# Patient Record
Sex: FEMALE | Race: BLACK OR AFRICAN AMERICAN | Employment: OTHER | URBAN - METROPOLITAN AREA
[De-identification: names, ages, dates, MRNs, and addresses within clinical notes are randomized per-mention and may not be internally consistent; named-entity substitution may affect disease eponyms.]

---

## 2020-01-01 ENCOUNTER — OFFICE VISIT - HEALTHEAST (OUTPATIENT)
Dept: GERIATRICS | Facility: CLINIC | Age: 80
End: 2020-01-01

## 2020-01-01 ENCOUNTER — MEDICAL CORRESPONDENCE (OUTPATIENT)
Dept: HEALTH INFORMATION MANAGEMENT | Facility: CLINIC | Age: 80
End: 2020-01-01

## 2020-01-01 ENCOUNTER — HOME CARE/HOSPICE - HEALTHEAST (OUTPATIENT)
Dept: HOME HEALTH SERVICES | Facility: HOME HEALTH | Age: 80
End: 2020-01-01

## 2020-01-01 ENCOUNTER — TELEPHONE (OUTPATIENT)
Dept: FAMILY MEDICINE | Facility: CLINIC | Age: 80
End: 2020-01-01

## 2020-01-01 ENCOUNTER — AMBULATORY - HEALTHEAST (OUTPATIENT)
Dept: GERIATRICS | Facility: CLINIC | Age: 80
End: 2020-01-01

## 2020-01-01 ENCOUNTER — TRANSFERRED RECORDS (OUTPATIENT)
Dept: HEALTH INFORMATION MANAGEMENT | Facility: CLINIC | Age: 80
End: 2020-01-01

## 2020-01-01 ENCOUNTER — DOCUMENTATION ONLY (OUTPATIENT)
Dept: FAMILY MEDICINE | Facility: CLINIC | Age: 80
End: 2020-01-01

## 2020-01-01 ENCOUNTER — RECORDS - HEALTHEAST (OUTPATIENT)
Dept: ADMINISTRATIVE | Facility: OTHER | Age: 80
End: 2020-01-01

## 2020-01-01 ENCOUNTER — COMMUNICATION - HEALTHEAST (OUTPATIENT)
Dept: SCHEDULING | Facility: CLINIC | Age: 80
End: 2020-01-01

## 2020-01-01 ENCOUNTER — COMMUNICATION - HEALTHEAST (OUTPATIENT)
Dept: GERIATRICS | Facility: CLINIC | Age: 80
End: 2020-01-01

## 2020-01-01 ENCOUNTER — VIRTUAL VISIT (OUTPATIENT)
Dept: FAMILY MEDICINE | Facility: CLINIC | Age: 80
End: 2020-01-01
Payer: MEDICARE

## 2020-01-01 ENCOUNTER — DOCUMENTATION ONLY (OUTPATIENT)
Dept: OTHER | Facility: CLINIC | Age: 80
End: 2020-01-01

## 2020-01-01 ENCOUNTER — AMBULATORY - HEALTHEAST (OUTPATIENT)
Dept: OTHER | Facility: CLINIC | Age: 80
End: 2020-01-01

## 2020-01-01 ENCOUNTER — RECORDS - HEALTHEAST (OUTPATIENT)
Dept: LAB | Facility: CLINIC | Age: 80
End: 2020-01-01

## 2020-01-01 VITALS — HEIGHT: 67 IN | WEIGHT: 158 LBS | BODY MASS INDEX: 24.8 KG/M2

## 2020-01-01 VITALS — WEIGHT: 158 LBS | BODY MASS INDEX: 24.38 KG/M2

## 2020-01-01 VITALS — WEIGHT: 158 LBS | BODY MASS INDEX: 23.95 KG/M2 | HEIGHT: 68 IN

## 2020-01-01 DIAGNOSIS — G62.9 NEUROPATHY: ICD-10-CM

## 2020-01-01 DIAGNOSIS — K59.00 CONSTIPATION, UNSPECIFIED CONSTIPATION TYPE: Primary | ICD-10-CM

## 2020-01-01 DIAGNOSIS — R41.3 SHORT-TERM MEMORY LOSS: ICD-10-CM

## 2020-01-01 DIAGNOSIS — I10 BENIGN ESSENTIAL HYPERTENSION: ICD-10-CM

## 2020-01-01 DIAGNOSIS — E87.6 HYPOKALEMIA: ICD-10-CM

## 2020-01-01 DIAGNOSIS — J18.9 PNEUMONIA OF RIGHT LOWER LOBE DUE TO INFECTIOUS ORGANISM: ICD-10-CM

## 2020-01-01 DIAGNOSIS — M79.605 PAIN IN BOTH LOWER EXTREMITIES: ICD-10-CM

## 2020-01-01 DIAGNOSIS — K59.00 CONSTIPATION, UNSPECIFIED CONSTIPATION TYPE: ICD-10-CM

## 2020-01-01 DIAGNOSIS — G60.9 IDIOPATHIC PERIPHERAL NEUROPATHY: ICD-10-CM

## 2020-01-01 DIAGNOSIS — Z87.01 HISTORY OF PNEUMONIA: ICD-10-CM

## 2020-01-01 DIAGNOSIS — R62.51 FAILURE TO THRIVE (0-17): ICD-10-CM

## 2020-01-01 DIAGNOSIS — E44.0 PROTEIN-CALORIE MALNUTRITION, MODERATE (H): ICD-10-CM

## 2020-01-01 DIAGNOSIS — M79.604 PAIN IN BOTH LOWER EXTREMITIES: ICD-10-CM

## 2020-01-01 DIAGNOSIS — I10 ESSENTIAL HYPERTENSION, BENIGN: ICD-10-CM

## 2020-01-01 DIAGNOSIS — R62.7 FAILURE TO THRIVE IN ADULT: ICD-10-CM

## 2020-01-01 DIAGNOSIS — M17.11 PRIMARY OSTEOARTHRITIS OF RIGHT KNEE: ICD-10-CM

## 2020-01-01 DIAGNOSIS — M16.0 PRIMARY OSTEOARTHRITIS OF BOTH HIPS: ICD-10-CM

## 2020-01-01 DIAGNOSIS — R19.7 DIARRHEA, UNSPECIFIED TYPE: ICD-10-CM

## 2020-01-01 DIAGNOSIS — R27.8 UNABLE TO BALANCE WHEN STANDING: ICD-10-CM

## 2020-01-01 DIAGNOSIS — R62.7 ADULT FAILURE TO THRIVE: Primary | ICD-10-CM

## 2020-01-01 DIAGNOSIS — R62.7 ADULT FAILURE TO THRIVE: ICD-10-CM

## 2020-01-01 DIAGNOSIS — M62.59 ATROPHY OF MUSCLE OF MULTIPLE SITES: ICD-10-CM

## 2020-01-01 DIAGNOSIS — E11.9 TYPE 2 DIABETES MELLITUS WITHOUT COMPLICATION, WITHOUT LONG-TERM CURRENT USE OF INSULIN (H): Primary | ICD-10-CM

## 2020-01-01 DIAGNOSIS — R91.8 PULMONARY NODULES: ICD-10-CM

## 2020-01-01 DIAGNOSIS — E04.1 THYROID NODULE: ICD-10-CM

## 2020-01-01 DIAGNOSIS — G62.9 PERIPHERAL POLYNEUROPATHY: ICD-10-CM

## 2020-01-01 DIAGNOSIS — E11.65 TYPE 2 DIABETES MELLITUS WITH HYPERGLYCEMIA, WITHOUT LONG-TERM CURRENT USE OF INSULIN (H): ICD-10-CM

## 2020-01-01 LAB
ALBUMIN SERPL-MCNC: 2.2 G/DL (ref 3.5–5)
ALP SERPL-CCNC: 109 U/L (ref 45–120)
ALT SERPL W P-5'-P-CCNC: 15 U/L (ref 0–45)
ANION GAP SERPL CALCULATED.3IONS-SCNC: 10 MMOL/L (ref 5–18)
AST SERPL W P-5'-P-CCNC: 17 U/L (ref 0–40)
BILIRUB SERPL-MCNC: 0.3 MG/DL (ref 0–1)
BUN SERPL-MCNC: 8 MG/DL (ref 8–28)
CALCIUM SERPL-MCNC: 8.2 MG/DL (ref 8.5–10.5)
CHLORIDE BLD-SCNC: 107 MMOL/L (ref 98–107)
CO2 SERPL-SCNC: 25 MMOL/L (ref 22–31)
CREAT SERPL-MCNC: 0.55 MG/DL (ref 0.6–1.1)
GFR SERPL CREATININE-BSD FRML MDRD: >60 ML/MIN/1.73M2
GLUCOSE BLD-MCNC: 94 MG/DL (ref 70–125)
POTASSIUM BLD-SCNC: 4.1 MMOL/L (ref 3.5–5)
PROT SERPL-MCNC: 4.8 G/DL (ref 6–8)
SODIUM SERPL-SCNC: 142 MMOL/L (ref 136–145)

## 2020-01-01 RX ORDER — ACETAMINOPHEN 500 MG
500-1000 TABLET ORAL EVERY 6 HOURS PRN
COMMUNITY
Start: 2020-01-01

## 2020-01-01 RX ORDER — ATORVASTATIN CALCIUM 10 MG/1
10 TABLET, FILM COATED ORAL DAILY
Qty: 90 TABLET | Refills: 1 | Status: SHIPPED | OUTPATIENT
Start: 2020-01-01

## 2020-01-01 RX ORDER — CHLORTHALIDONE 25 MG/1
12.5 TABLET ORAL DAILY
COMMUNITY
Start: 2020-01-01 | End: 2020-01-01

## 2020-01-01 RX ORDER — METFORMIN HCL 500 MG
500 TABLET, EXTENDED RELEASE 24 HR ORAL
Qty: 90 TABLET | Refills: 3 | Status: SHIPPED | OUTPATIENT
Start: 2020-01-01

## 2020-01-01 RX ORDER — POLYETHYLENE GLYCOL 3350 17 G/17G
1 POWDER, FOR SOLUTION ORAL DAILY
COMMUNITY
Start: 2020-01-01

## 2020-01-01 RX ORDER — CHLORTHALIDONE 25 MG/1
12.5 TABLET ORAL DAILY
Qty: 45 TABLET | Refills: 1 | Status: SHIPPED | OUTPATIENT
Start: 2020-01-01 | End: 2020-01-01 | Stop reason: SINTOL

## 2020-01-01 RX ORDER — ATORVASTATIN CALCIUM 10 MG/1
10 TABLET, FILM COATED ORAL DAILY
COMMUNITY
End: 2020-01-01

## 2020-01-01 ASSESSMENT — MIFFLIN-ST. JEOR
SCORE: 1134.71
SCORE: 1204.56
SCORE: 1232.24
SCORE: 1204.56
SCORE: 1204.56
SCORE: 1224.31

## 2020-05-13 PROBLEM — R91.8 PULMONARY NODULES: Status: ACTIVE | Noted: 2020-01-01

## 2020-05-13 PROBLEM — E04.1 THYROID NODULE: Status: ACTIVE | Noted: 2020-01-01

## 2020-05-13 NOTE — PROGRESS NOTES
Preceptor Attestation:   Patient seen and evaluated via video visit. I discussed the patient with the resident. I have verified the content of the note, which accurately reflects my assessment of the patient and the plan of care.   Supervising Physician:  Juve Lundberg MD.

## 2020-05-13 NOTE — PROGRESS NOTES
"Family Medicine Video Visit Note  Veronica is being evaluated via a billable video visit.               Video Visit Consent     Patient was verbally read the following and verbal consent was obtained.  \"Video visits are billed at different rates depending on your insurance coverage. During this emergency period, for some insurers they may be billed the same as an in-person visit.  Please reach out to your insurance provider with any questions.  If during the course of the call the physician/provider feels a telephone visit is not appropriate, you will not be charged for this service.\"     (Name person giving consent:  Patient   Date verbal consent given:  5/13/2020  Time verbal consent given:  12:34 PM)    Patient would like the video invitation sent by: Text to cell phone: [phone]                      HPI       Video Start Time: 12:49 PM    Veroinca was recently hospitalized for the following issues. She is feeling much better at the moment. She has difficulty walking and her daughter and brother's partner take care of her and live in the house.     Veronica presents to clinic today for the following health issues:    Elevated Troponin:  Hospital Follow-up. She was hospitalized for FFT and found to have an elevated troponin with a normal Echo and Stress test. No chest pain or SOB. Her HR feels normal. Unclear etiology.     Leg Pain/Osteoarthritis:  4 month history, Neg doppler US, Normal B12. She is taking 1000 mg of tylenol every 8 hours. The recommended home PT/OT, the family would like home PT.      Constipation/Diarrhea with Hypokalemia and Hypomagnesemia:  Constipation with diarrhea and elctrolyte disturbances. Improved. Diarreha is improved. She is eating 3 meals a day now.     Hypertension: She was started on chlorthalidone, no side effects. No headache or blurry vision. No new weakness or fainting.     The daughter reports that she needs a toilet seat that is elevated. They did not get a DME order for this but " would like one. They already have a transfer belt.   [facility] [address]  Fax: [phone]  Phone: [phone]     Patient berry fever, chills, dysuria, headache, fainting, dizziness, lightheadedness, weight loss.     She has a Fhx of DMII and HTN    She does smoke      Current Outpatient Medications   Medication Sig Dispense Refill     atorvastatin (LIPITOR) 10 MG tablet Take 10 mg by mouth daily       No Known Allergies          Assessment and Plan       ICD-10-CM    1. Adult failure to thrive  R62.7    2. Pulmonary nodules  R91.8    3. Thyroid nodule  E04.1    4. Diarrhea, unspecified type  R19.7 Basic Metabolic Panel ([place])   5. Constipation, unspecified constipation type  K59.00    6. Type 2 diabetes mellitus with hyperglycemia, without long-term current use of insulin (H)  E11.65    7. Primary osteoarthritis of right knee  M17.11 order for DME       1. Home PT  2. Elevated Toilet Seat  3. Continue Chlorthalidone - but consider switching to Amlodipine if hypokalemia persists.   4. Diarrhea and Electrolyte abnormalities: Follow-up BMP - recommended this week, patient's daughter reported that she will and schedule this. Continue Miralax titrated to diarrhea.   5. Continue atorvastatin for now, but consider discontinuing  6. Continue diet modification for DMII, no medications at this time.  7. Lung and Thyroid nodules - after much discussion the family would like to focus on quality of life and avoid procedures. Will discuss again in the future.   8. Nutrition: Discussed eating 3 meals a day and to include vegetables in the diet, especially foods rich in potassium and magnesium like spinach.   9. Follow-up IN CLINIC in 2-4 weeks as able.       Refilled medications that would be required in the next 3 months.     After Visit Information:  Patient declined AVS         Video-Visit Details    Type of service:  Video Visit    Video End Time (time video stopped): 1:21 PM    Originating  Location (pt. Location): Home    Distant Location (provider location):  [facility]     Mode of Communication:  Video Conference via [email or web address] Howell MD  I precepted today with Dr. Lundberg

## 2020-05-14 NOTE — TELEPHONE ENCOUNTER
[facility] phone call message- general phone call:    Reason for call: She had a apt yesterday with  and she was prescribed a walker with a seat and a toilet seat with arms she took it to [facility] and they told her her insurance does not cover it and she would have to pay $80 for that. She is not sure why and needs to talk to someone.    Action desired: call back    Return call needed: Yes    OK to leave a message on voice mail? Yes    Advised patient to response may take up to 2 business days: Yes    Primary language: English      needed? No    Call taken on May 14, 2020 at 1:59 PM by Benny Diamond

## 2020-05-14 NOTE — PATIENT INSTRUCTIONS
05/14/20   HOME CARE REFERRAL  [facility]  Phone: [phone]  Fax: [phone]    Referral, demographics, medication list and last office note faxed to [facility] at [phone] who will contact patient to schedule.    Fartun Frances

## 2020-05-14 NOTE — TELEPHONE ENCOUNTER
Called [facility] and talked to Laney and she said that insurance will not cover the toilet seat and that she needs to pay a portion for the walker, they will not cover the whole thing.  She would have to pay the $80 no matter what or she will not get it. Left msg on pt's voicemail to call me back. See Rueda, CMA

## 2020-05-15 NOTE — TELEPHONE ENCOUNTER
Veronica's daughter, Carmen called back and she said that she talked to Medicare and they said that they will pay 80% for the things she needs and the other 20% will be covered thru [employer]. Told her what was told to me by a representative at [facility] that due to her insurance she will need to pay $80. The daughter is working on this with her insurance at this time. The daughter states that she does not need the walker at this time, states that pt is unable to get out of her bed and walk to the bathroom. Daughter would like for her to get a bedside commode if possible.  PT is coming out to her mom next week to access for a walker. Veronica does have another phone visit on Monday- unsure with who, but hopefully it will be taken care of by Monday or soon. Pt's daughter understand.  See Rueda, CMA

## 2020-05-15 NOTE — TELEPHONE ENCOUNTER
[facility] phone call message- general phone call:    Reason for call: Requesting verbal orders for a delay in PT start of care until 05/17.    Return call needed: Yes    OK to leave a message on voice mail? Yes    Primary language: English      needed? No    Call taken on May 15, 2020 at 9:19 AM by Ute Rosales

## 2020-05-18 NOTE — TELEPHONE ENCOUNTER
[facility] phone call message- general phone call:    Reason for call: Requesting PT 3 times a week for 2 weeks then 2 times a week for 2 weeks to work on strength and mobility. Also looking for an okay to do a OT evaluation in home.     Return call needed: Yes    OK to leave a message on voice mail? Yes    Primary language: English      needed? No    Call taken on May 18, 2020 at 9:17 AM by Grisel Flores-Cardona

## 2020-05-18 NOTE — TELEPHONE ENCOUNTER
Verbal orders for PT 3 times a week for 2 weeks then 2 times a week for 2 weeks to work on strength and mobilityand for an OT evaluation in home given to N    Note routed to Dr.Grosland Ellis RN

## 2020-05-18 NOTE — PROGRESS NOTES
"Family Medicine Video Visit Note  Veronica is being evaluated via a billable video visit.               Video Visit Consent     Patient was verbally read the following and verbal consent was obtained.  \"Video visits are billed at different rates depending on your insurance coverage. During this emergency period, for some insurers they may be billed the same as an in-person visit.  Please reach out to your insurance provider with any questions.  If during the course of the call the physician/provider feels a telephone visit is not appropriate, you will not be charged for this service.\"     (Name person giving consent:  Patient   Date verbal consent given:  2020  Time verbal consent given:  9:04 AM)    Patient would like the video invitation sent by: Text to cell phone: [phone]             No chief complaint on file.      {If Provider starts visit, do consent and rooming above using \"VIRTUAL tab.\" DELETE THIS TEXT.}     {If PCS NOT rooming AND it is an interpreted visit, fill out the following. Otherwise delete.}  Due to patient being non-English speaking/uses sign language, an  was used for this visit. Only for face-to-face interpretation by an external agency, date and length of interpretation can be found on the scanned worksheet.     name: ***  Agency: {FMinterpagency:221337}  Language: {FMinterplan}   Telephone number: ***  Type of interpretation: {[facility]201362}           HPI     {Tell patient that you will call them back if the video visit is disconnected.  Confirm phone number to call them back on if you have not already done so.}  Video Start Time: { :8738332}    Veronica presents to clinic today for the following health issues:    {SUPERLIST (Optional):656671}    {Additional problems for the provider to add (optional):674263}  Current Outpatient Medications   Medication Sig Dispense Refill     acetaminophen (TYLENOL) 500 MG tablet Take 1-2 tablets (500-1,000 mg) by " "mouth every 6 hours as needed for mild pain       atorvastatin (LIPITOR) 10 MG tablet Take 10 mg by mouth daily       chlorthalidone (HYGROTON) 25 MG tablet Take 0.5 tablets (12.5 mg) by mouth daily       order for DME Equipment being ordered: Elevated toilet seat 1 Product 0     polyethylene glycol (MIRALAX) 17 g packet Take 17 g by mouth daily       No Known Allergies           Review of Systems:     {ROS COMP (Optional):804175}         Physical Exam:     There were no vitals taken for this visit.  Estimated body mass index is 24.38 kg/m  as calculated from the following:    Height as of 5/13/20: 1.715 m (5' 7.5\").    Weight as of 5/13/20: 71.7 kg (158 lb).    {video visit exam brief selected:631518::\"GENERAL: Healthy, alert and no distress\",\"EYES: Eyes grossly normal to inspection.  No discharge or erythema, or obvious scleral/conjunctival abnormalities.\",\"RESP: No audible wheeze, cough, or visible cyanosis.  No visible retractions or increased work of breathing.  \",\"SKIN: Visible skin clear. No significant rash, abnormal pigmentation or lesions.\",\"NEURO: Cranial nerves grossly intact.  Mentation and speech appropriate for age.\",\"PSYCH: Mentation appears normal, affect normal/bright, judgement and insight intact, normal speech and appearance well-groomed.\"}      {Result Choices:549650}          Assessment and Plan   {Diag Picklist:752683}    Refilled medications that would be required in the next 3 months.     After Visit Information:  {avs options:546832}      No follow-ups on file.      Video-Visit Details    Type of service:  Video Visit    Video End Time (time video stopped): { :2738798}    Originating Location (pt. Location): {video visit patient location:514130::\"Home\"}    Distant Location (provider location):  [facility]     Mode of Communication:  Video Conference via [email or web address]      Ivan Beal MD  I precepted today with ***    { Help text -click delete when highlighted- Precept ALL visits. " Preceptor MUST SEE the patient. Bill regular E&M codes for this visit! Documentation needs to support your billing.}

## 2020-05-18 NOTE — PROGRESS NOTES
"Family Medicine Video Visit Note  Veronica is being evaluated via a billable video visit.             Video Visit Consent     Patient was verbally read the following and verbal consent was obtained.  \"Video visits are billed at different rates depending on your insurance coverage. During this emergency period, for some insurers they may be billed the same as an in-person visit.  Please reach out to your insurance provider with any questions.  If during the course of the call the physician/provider feels a telephone visit is not appropriate, you will not be charged for this service.\"     (Name person giving consent:  Patient   Date verbal consent given:  5/18/2020  Time verbal consent given:  9:04 AM)    Patient would like the video invitation sent by: Text to cell phone: [phone]        Chief Complaint   Patient presents with     Constipation     patient has no bowel movement since last Tuesday             HPI     Video Start Time: 9:20 AM    Veronica presents to clinic today for the following health issues:    Patient is seen by video visit today for follow-up of failure to thrive and new complaint of constipation.  Recently, patient was hospitalized earlier this month for failure to thrive and found to be hypokalemic and hypertensive.  Her potassium had normalized prior to discharge and thus was not discharged with potassium supplementation.  She was discharged home with PT and OT.  She lives at home with her daughter and daughter's partner.    Since being home, she has been too weak to get up to go to the bathroom and has been wearing depends.  She is not had all movement since Tuesday, though she has been passing gas.  Urinating normally.  She does not complain of any abdominal pain, nausea, vomiting.  She has been eating normally, eating accommodation of meats and vegetables, though her daughter notes that she is \"quite picky\".  She had been discharged with MiraLAX, but she is not been taking this.  She had used " "MiraLAX in the past to \"stay regular\".  Her daughter thinks that part of the problem with not stooling is that her mother just does not want to have a bowel movement in her depends.  She is not using any fiber supplementation.  She is not having any cramping of the extremities, no chest pain.    Her adult daughter also notes that she thinks that having a bedside commode would be helpful, given that her mother is still quite immobile.  Physical therapy was out yesterday for evaluation, they have not yet heard from OT regarding initial evaluation.  The daughter does have the number to call, and plans on doing this today.    Current Outpatient Medications   Medication Sig Dispense Refill     acetaminophen (TYLENOL) 500 MG tablet Take 1-2 tablets (500-1,000 mg) by mouth every 6 hours as needed for mild pain       atorvastatin (LIPITOR) 10 MG tablet Take 10 mg by mouth daily       chlorthalidone (HYGROTON) 25 MG tablet Take 0.5 tablets (12.5 mg) by mouth daily       order for DME Equipment being ordered: Elevated toilet seat 1 Product 0     polyethylene glycol (MIRALAX) 17 g packet Take 17 g by mouth daily       No Known Allergies           Review of Systems:     12 point review of systems was negative except as stated above in HPI         Physical Exam:     Wt 71.7 kg (158 lb)   BMI 24.38 kg/m    Estimated body mass index is 24.38 kg/m  as calculated from the following:    Height as of 5/13/20: 1.715 m (5' 7.5\").    Weight as of this encounter: 71.7 kg (158 lb).    GENERAL: Patient resting comfortably in bed in no acute distress  EYES: Eyes grossly normal to inspection, makes eye contact with phone camera.  Wearing glasses  RESP: No audible wheeze, cough, or visible cyanosis.  No visible retractions or increased work of breathing.    SKIN: Visible skin clear. No significant rash, abnormal pigmentation or lesions.  NEURO: Cranial nerves grossly intact.  Mentation and speech appropriate for age.  Did not attempt to have " patient ambulate.  PSYCH: Mentation appears normal, affect normal/bright, judgement and insight intact, normal speech and appearance well-groomed.    No new results review, unable to perform labs today as this was a video visit          Assessment and Valdez Martin was seen today for constipation.    Diagnoses and all orders for this visit:    Constipation, unspecified constipation type  Patient with roughly 1 week without bowel movement although is passing flatus.  She is not having any abdominal discomfort.  Has not been using prescribed MiraLAX, previously used this without issue.  Suspect there may be some hesitancy to defecate in her depends.  I did make sure that they had the correct number for occupational therapy so that they can call.  I do think that a bedside commode would help her to be helpful to have OT's recommendations prior to ordering this.  Discussed increasing fiber intake, possibly some juice.  Also discussed that it would be helpful to start the MiraLAX.  Discussed the importance of coming in this week for lab only visit, as constipation could be related to electrolyte abnormalities as well.  -     Magnesium ([facility]); Future  -     Comprehensive Metabolic ([facility]) - Results > 1 hr; Future    Hypokalemia  Adult failure to thrive  Potassium was normal on discharge, she is now on supplementation.  Daughter will call to schedule lab only visit for this week for CMP and magnesium.  -     Magnesium ([facility]); Future  -     Comprehensive Metabolic ([facility]) - Results > 1 hr; Future    Benign essential hypertension  Patient was started on antihypertensives on the patient, with the possibility that it would need to be titrated as an outpatient.  They do not have a home blood pressure cuff, ordered this today.  Instructed them to check 1-2 times daily and log these.  No changes to blood pressure medications today.  -     Home Blood Pressure Monitor    Refilled medications that would be  required in the next 3 months.     After Visit Information:  Patient chose to view AVS via [employer]      No follow-ups on file.      Video-Visit Details    Type of service:  Video Visit    Video End Time (time video stopped): 9:39 AM    Originating Location (pt. Location): Home    Distant Location (provider location):  [facility] [address]     Mode of Communication:  Video Conference via [employer]    Ivan Beal MD  I precepted today with Dr. Mccormick      DME (Durable Medical Equipment) Orders and Documentation  Orders Placed This Encounter   Procedures     Home Blood Pressure Monitor      The patient was assessed and it was determined the patient is in need of the following listed DME Supplies/Equipment. Please complete supporting documentation below to demonstrate medical necessity.      DME All Other Item(s) Documentation    List reason for need and supporting documentation for medical necessity below for each DME item.     1. Face to face was completed via phone visit. Patient has benign essential hypertension, titration of medications may be needed based on home BP readings.

## 2020-05-19 NOTE — PROGRESS NOTES
Preceptor Attestation:   Patient seen and evaluated via video visit. I discussed the patient with the resident. I have verified the content of the note, which accurately reflects my assessment of the patient and the plan of care.   Supervising Physician:  Philippe Mccormick MD.

## 2020-05-21 NOTE — TELEPHONE ENCOUNTER
[facility] phone call message-patient reporting a symptom:     Symptom: pt has appt for lab Friday 05/22/2020 but daughter stating mom is not eating much, not getting out of bed, daughter frustrated and wondering if someone can come out to home and draw blood.     Same Day Visit Offered: no    Additional comments: .    OK to leave message on voice mail? Yes    Primary language: English      needed? No    Call taken on May 21, 2020 at 1:41 PM by Rocio Ball

## 2020-05-21 NOTE — TELEPHONE ENCOUNTER
Daughter Carmen (pie a) asking if someone can come to pt's house and draw blood. Daughter informed that since pt does not have home services there is a company that will come to the home but at a cost to the patient. Daughter sounds frustrated stating she has been meeting resistance from patient and patient's , resistance from insurance carrier in what will be covered.  Writer offered consult with  to discuss care for her Mother and maybe respite care for her.  Daughter agreeable to  calling . Daughter states she will try to have her mother get her labs done     Note routed to BRIT Ellis RN

## 2020-05-26 NOTE — TELEPHONE ENCOUNTER
[facility] phone call message- medication clarification/question:    Full Medication Name:     DME - Bedside Cammode    Question: Wondering when they'll get the bedside commode because pt cannot get up to walk to the restroom.      And Wondering about referral for someone to go to their house to draw blood from pt.     Pharmacy confirmed as [id number] - [place], [place] - [address]: Yes    OK to leave a message on voice mail? Yes    Primary language: English      needed? No    Call taken on May 26, 2020 at 11:51 AM by Lynn Kaufman CMA

## 2020-05-26 NOTE — TELEPHONE ENCOUNTER
Daughter requesting bedside commode pt is unable to walk to bathroom.   Daughter concerned that insurance will not pay. Writer spoke with [facility] who states the commode is billable with pt's insurance.    btrn

## 2020-06-03 NOTE — TELEPHONE ENCOUNTER
[facility] phone call message - order or referral request for patient:     Order or referral being requested: ORDERS      Additional Comments: OT 3 visits in 30 days for adaptive equipment needs. Also medical social workers order 1 visit in 8 days for community resources     OK to leave a message on voice mail? Yes      Primary language: English      needed? No    Call taken on Emmy 3, 2020 at 4:37 PM by Benny Diamond

## 2020-06-03 NOTE — TELEPHONE ENCOUNTER
LUIS DANIEL returned the call to Ms. Veronica Dia' daughter, Carmen. Reached her via the mobile # listed. Carmen states that her mom needs help with a lot of different things including but not limited to bathing, toileting, dressing, grooming. She states that Veronica has Medicare and a supplemental commercial policy through [employer]- this is through Vreonica's spouse' employer or union it sounds like.     Carmen is frustrated because she states that insurance won't pay for PCA supports for her mom. They were having issues getting her a commode as well but it sounds like that is resolving with an OT visit this afternoon at 1pm, where they will complete the assessment and necessary paperwork.     Veronica is currently receive PT/OT. She does not like her PT person and doesn't want to do the exercises when they are there and when they leave. Carmen is trying to encourage her and remind her that she needs to do the exercises to increase functioning and mobility.     In addition, Veronica needs lab draws and cannot get to clinic. Carmen states insurance won't pay for lab draws in the home. She is wondering if home care will help with this in the interim or while they are providing in home PT/OT. SW agreed to check on this.     Carmen informs that her mom does not qualify for Medicaid. She is retired and not working but states she was an employee for the 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     This SW was empathetic to the conversation and is willing to help as best as they are able. Informed that in most cases, those who do not qualify for [place] and need in home supports and services, typically have to pay for those supports out of pocket. The alternative in some cases is if the person has paid into some sort of long term care insurance that has home supports built into the policy. Carmen is not sure if her mother has done this but will check with her.     Carmen  would like this SW to call [facility] to verify that none of the above need services are available for coverage under the commercial plan she has. LUIS DANIEL agreed to do so and getting as much information as possible, despite not having a written ISA.    LUIS DANIEL will plan to call [facility] in the next day or 2 and get back to Carmen.     NEERAJ Wilcox

## 2020-06-03 NOTE — TELEPHONE ENCOUNTER
Verbal orders for OT 3 visits in 30 days for adaptive equipment needs. Also medical social workers order 1 visit in 8 days for community resources given to HHN       Note routed to Dr.Grosland Ellis RN

## 2020-06-09 NOTE — TELEPHONE ENCOUNTER
[facility] Family Medicine phone call message- general phone call:    Reason for call: Nurse is calling to continue pt 2 times a week for 3 weeks.     Return call needed: Yes    OK to leave a message on voice mail? Yes    Primary language: English      needed? No    Call taken on June 9, 2020 at 2:20 PM by Grisel Flores-Cardona

## 2020-06-09 NOTE — TELEPHONE ENCOUNTER
Verbal orders to continue PT 2 times a week for 3 weeks. given to [facility]    Note routed to Dr.Grosland Rhonda POWERS

## 2020-06-12 NOTE — PATIENT INSTRUCTIONS
I have placed orders for home care, the commode in the hospital bed.  I hope that these will go through without a hitch, but touch base with [facility] in the next day or 2 to inquire about the commode in the hospital bed.    Home nursing care staff to call you to set up a time for them to come at your home.    06/12/20   HOME CARE REFERRAL  [facility]  Phone: [phone]  Fax: [phone]    Referral, demographics, medication list and last office note faxed to [facility] at [phone] who will contact patient to schedule.    Fartun Frances

## 2020-06-12 NOTE — PROGRESS NOTES
Preceptor Attestation:   Patient seen and evaluated via video visit. I discussed the patient with the resident. I have verified the content of the note, which accurately reflects my assessment of the patient and the plan of care.   Supervising Physician:  Wilder Henley MD.

## 2020-06-12 NOTE — PROGRESS NOTES
"Family Medicine Video Visit Note  Veronica is being evaluated via a billable video visit.           Video Visit Consent     Patient was verbally read the following and verbal consent was obtained.  \"Video visits are billed at different rates depending on your insurance coverage. During this emergency period, for some insurers they may be billed the same as an in-person visit.  Please reach out to your insurance provider with any questions.  If during the course of the call the physician/provider feels a video visit is not appropriate, you will not be charged for this service.\"     (Name person giving consent:  See PCS note   Date verbal consent given:  See PCS note   Time verbal consent given:  See PCS note     Patient would like the video invitation sent by: Text to cell phone: [phone]     Chief Complaint   Patient presents with     RECHECK     discuss hospital bed            HPI     Video Start Time: 8:30 AM    Veronica presents to clinic today for the following health issues:    Patient is seen via video visit along with her daughter to discuss obtaining a hospital bed.  Patient is relatively new to our clinic.  She established with us after being admitted to [facility] at the end of May of this year for failure to thrive, hypomagnesemia, hypokalemia.  She is continued to be mostly bedbound, has pain related to arthritis in her right knee and complains of bilateral foot numbness.  She does not have any pain in the feet.  Her daughter does check her feet every day for cuts or wounds.  She does have home PT/OT working with her currently.  She does not have any home nursing care is right now.  Of note, in the hospital she did have elevated A1c between 7-8.  Due to her immobility she has not been able to get to clinic for repeat lab work.  Her daughter is her primary caretaker.      Current Outpatient Medications   Medication Sig Dispense Refill     acetaminophen (TYLENOL) 500 MG tablet Take 1-2 tablets (500-1,000 " "mg) by mouth every 6 hours as needed for mild pain       atorvastatin (LIPITOR) 10 MG tablet Take 10 mg by mouth daily       chlorthalidone (HYGROTON) 25 MG tablet Take 0.5 tablets (12.5 mg) by mouth daily       metFORMIN (GLUCOPHAGE-XR) 500 MG 24 hr tablet Take 1 tablet (500 mg) by mouth daily (with dinner) 90 tablet 3     polyethylene glycol (MIRALAX) 17 g packet Take 17 g by mouth daily       Misc. Devices (COMMODE BEDSIDE) MISC 1 Units as needed (voiding) 1 each 0     order for DME Equipment being ordered: Elevated toilet seat (Patient not taking: Reported on 6/12/2020) 1 Product 0     No Known Allergies           Review of Systems:     Complete 12 point ROS negative except as stated above in HPI         Physical Exam:     Ht 1.702 m (5' 7\")   Wt 71.7 kg (158 lb)   BMI 24.75 kg/m    Estimated body mass index is 24.75 kg/m  as calculated from the following:    Height as of this encounter: 1.702 m (5' 7\").    Weight as of this encounter: 71.7 kg (158 lb).    GENERAL: Lying in bed, alert and no distress  EYES: Eyes grossly normal to inspection.  No discharge or erythema, or obvious scleral/conjunctival abnormalities.   RESP: No audible wheeze, cough, or visible cyanosis.  No visible retractions or increased work of breathing.    SKIN: Visible skin clear. No significant rash, abnormal pigmentation or lesions.  NEURO: Mentation and speech appropriate for age.  PSYCH: Mentation appears normal, affect normal/bright, judgement and insight intact, normal speech and appearance well-groomed.      Results from last visit:  No results found for any previous visit.         Assessment and Plan   Veronica was seen today for recheck.    Diagnoses and all orders for this visit:    Patient is seen today to discuss hospital bed acquisition due to her immobility.  Given that she is a relatively new patient to our clinic and follow-up lab work is been difficult to do the COVID pandemic we do not have a great explanation to explain her " bilateral foot numbness.  She does have an elevated A1c above 7, but I would not necessarily expect diabetic neuropathy at this level of an A1c.  Her B12 was normal in the hospital.  Her TSH was normal in the hospital.  Since we have been unable to get lab work, it is possible that she has electrolyte derangements.  Lower suspicion for alcohol or tertiary syphilis.  Could just be that this is age-related or idiopathic.  I do think that hospital but would improve mobility, was certainly make repositioning much easier.  Given her new diagnosis of diabetes, would also be very beneficial to have home nursing care for assessment, diabetes education, and to obtain lab work given that the patient is unable to make it to the clinic.  Initiated metformin for diabetes, side effects discussed.    Type 2 diabetes mellitus without complication, without long-term current use of insulin (H)  -     metFORMIN (GLUCOPHAGE-XR) 500 MG 24 hr tablet; Take 1 tablet (500 mg) by mouth daily (with dinner)  -     HOME CARE NURSING REFERRAL    Peripheral polyneuropathy  -     HOME CARE NURSING REFERRAL    Primary osteoarthritis of both hips  -     HOME CARE NURSING REFERRAL    Primary osteoarthritis of right knee  -     HOME CARE NURSING REFERRAL    Failure to thrive in adult  -     HOME CARE NURSING REFERRAL  -     CBC with Diff Plt ([facility]); Future        Refilled medications that would be required in the next 3 months.     After Visit Information:  Will print and mail AVS     No follow-ups on file.    Video-Visit Details    Type of service:  Video Visit    Video End Time (time video stopped): 8:54 AM    Originating Location (pt. Location): Home    Distant Location (provider location):  [facility]     Mode of Communication:  Video Conference via [employer]      Ivan Beal MD  I precepted today with Dr. Henley

## 2020-06-12 NOTE — PROGRESS NOTES
"Family Medicine Video Visit Note  Veronica is being evaluated via a billable video visit.               Video Visit Consent     Patient was verbally read the following and verbal consent was obtained.  \"Video visits are billed at different rates depending on your insurance coverage. During this emergency period, for some insurers they may be billed the same as an in-person visit.  Please reach out to your insurance provider with any questions.  If during the course of the call the physician/provider feels a video visit is not appropriate, you will not be charged for this service.\"     (Name person giving consent:  Patient and daughter   Date verbal consent given:  6/12/2020  Time verbal consent given:  8:17 AM)    Patient would like the video invitation sent by: Text to cell phone: [phone]             Chief Complaint   Patient presents with     RECHECK     discuss hospital bed                "

## 2020-06-16 NOTE — TELEPHONE ENCOUNTER
[facility] phone call message- general phone call:    Reason for call: She needs a call back re if her mother prescription got sent to [facility] she said last week when she talked to them it seemed like some paperwork needed to be signed and she just wants to know if it was sent    Action desired: call back.    Return call needed: Yes    OK to leave a message on voice mail? Yes    Advised patient to response may take up to 2 business days: Yes    Primary language: English      needed? No    Call taken on June 16, 2020 at 9:37 AM by Benny Diamond

## 2020-06-18 NOTE — TELEPHONE ENCOUNTER
Requested additional information faxed to [facility] regarding insurance approval for commode. Faxed 6/17/20    btrn    Daughter informed of above. Intake number for [facility] given to daughter in response to request for help with becoming pts  POA    BTRN

## 2020-06-22 NOTE — TELEPHONE ENCOUNTER
[facility] Family Medicine phone call message- patient requesting a refill:    Full Medication Name: Chlorthalidone 25mg and Atorvastatin 10mg    Dose: ?    Pharmacy confirmed as   [id number] - [place], [place] - [id number] [place] [address]  Phone: [phone] Fax: [phone]  : Yes    Additional Comments: she needs these filled.     OK to leave a message on voice mail? Yes      Primary language: English      needed? No    Call taken on June 22, 2020 at 8:15 AM by Benny Diamond

## 2020-06-25 NOTE — TELEPHONE ENCOUNTER
[facility]  Per their records Hosp. Bed has been denied by insurance. Supporting documents will be resubmitted.     Daughter informed of above and states her mother needs the hosp. Bed because she does not partticipate in her care.    Note routed to  and Dr.Grosland Rhonda POWERS

## 2020-06-25 NOTE — TELEPHONE ENCOUNTER
[facility] Family Medicine phone call message- general phone call:    Reason for call: the pt called to ask about the medical bed  And the commode and would like a call back        Return call needed: Yes    OK to leave a message on voice mail? Yes      Primary language: English      needed? No    Call taken on June 25, 2020 at 11:27 AM by Jameson Ulloa

## 2020-06-26 NOTE — TELEPHONE ENCOUNTER
Daughter quite concerned with pt's mental status. She is concerned that pt's lack of motivation to care for herself or to move out of the bed may be neurological,early dementia or alzheimer's. Daughter will schedule an in person appt for patient to be evaluated and possible need for referrals.    Note routed to Dr.Grosland Rhonda POWERS

## 2020-06-26 NOTE — TELEPHONE ENCOUNTER
Verbal orders given to HHN for skilled nursing evaluation for skin integrity.        Note routed to Dr.Grosland Rhonda POWERS

## 2020-06-26 NOTE — TELEPHONE ENCOUNTER
[facility] phone call message- general phone call:    Reason for call: She needs to talk to a nurse re her mothers leg pain and she doesn't seem to be doing well mind wise.    Action desired: call back.    Return call needed: Yes    OK to leave a message on voice mail? Yes    Advised patient to response may take up to 2 business days: Yes    Primary language: English      needed? No    Call taken on June 26, 2020 at 9:03 AM by Benny Diamond

## 2020-06-26 NOTE — TELEPHONE ENCOUNTER
[facility] Family Medicine phone call message- general phone call:    Reason for call: Requesting verbal orders for skilled nursing evaluation for skin integrity.     Return call needed: Yes    OK to leave a message on voice mail? Yes    Primary language: English      needed? No    Call taken on June 26, 2020 at 3:48 PM by Grisel Flores-Cardona

## 2020-06-30 NOTE — TELEPHONE ENCOUNTER
[facility] phone call message- general phone call:    Reason for call: they have been seeing her for home care PT they discharged the PT portion because she has canceled  several appointment. But nursing and OT are still in the home.    Action desired: call back.    Return call needed: Yes    OK to leave a message on voice mail? Yes    Advised patient to response may take up to 2 business days: Yes    Primary language: English      needed? No    Call taken on June 30, 2020 at 9:24 AM by Benny Diamond

## 2020-07-23 NOTE — TELEPHONE ENCOUNTER
"Carmen reports her mother is now completely bedbound (was previously getting out sometimes), her legs are increasingly swollen and bruised, and she is sometimes confused. She has been her full time caretaker as their insurance does not help cover a PCA. She is currently living with the patient and her stepfather (patient's ), however is moving out because she does not get along with her stepdad. She is worried about who will care for her mother when she leaves; she notes she wants her mother to come with her but she will not. Her stepfather can \"barealy care for himself.\"     Given mother is bedbound and decompensating, agreed with daughter it would be appropriate to call 911 for transport to the hospital. Encouraged her to bring up the care issues with social workers in the hospital. She is agreeable to this plan and will call us with updates. Routed to LUIS DANIEL Soria. ./LR  "

## 2020-07-23 NOTE — TELEPHONE ENCOUNTER
[facility] Family Medicine phone call message- general phone call:    Reason for call: the pt's daughter called to jj to the  about her mothers health asn would like a call back     Return call needed: Yes    OK to leave a message on voice mail? Yes    Primary language: English      needed? No    Call taken on July 23, 2020 at 10:25 AM by Jameson Ulloa

## 2020-08-06 NOTE — TELEPHONE ENCOUNTER
[facility] Family Medicine phone call message- general phone call:    Reason for call: The Pt is in a assisted living home and the daughter called to talk to the Dr about her mom not being saúl to walkand would like a call back       Return call needed: Yes    OK to leave a message on voice mail? Yes    Primary language: English      needed? No    Call taken on August 6, 2020 at 8:20 AM by Jameson Ulloa

## 2020-08-06 NOTE — TELEPHONE ENCOUNTER
Daughter voicing concerns regarding patient's condition,lack of progress,and amount of care she will need. Daughter's biggest concern is they have not been given a diagnosis or a reason for patient's condition and that pt's c/o leg and feet pain are not being addressed.   Daughter agreeable to speaking with social work again regarding  financial and placement issues. Daughter will bring in new advance directives.     [facility]  [phone]  2nd floor [facility]  [phone]    Note routed to  and SAILAJA Ellis RN

## 2020-08-07 PROBLEM — M79.605 PAIN IN BOTH LOWER EXTREMITIES: Status: ACTIVE | Noted: 2020-01-01

## 2020-08-07 PROBLEM — M79.604 PAIN IN BOTH LOWER EXTREMITIES: Status: ACTIVE | Noted: 2020-01-01

## 2020-08-07 PROBLEM — E87.6 HYPOKALEMIA: Status: ACTIVE | Noted: 2020-01-01

## 2020-08-07 NOTE — TELEPHONE ENCOUNTER
This SW reached out to patient's daughter, Carmen, to discuss financial resources questions and placement concerns. SW spoke with Carmen- she requested a return call a little later as she was currently at work.     NEERAJ Wilcox

## 2020-08-07 NOTE — TELEPHONE ENCOUNTER
Called and talked to Carmen. Veronica has continued to have the same leg symptoms- seems more muscular than originating to specific joints. Also still having the feet numbness. The hope had been that with PT at the TCU her leg pain would improve and she'd become more mobile, with goal of enough mobility to safely be at home (arcelia Carmen has been helping with ADL's, but works during the day so there's significant portions of the day where Veronica is stuck in a bed without ability to get out herself).    Pain: Right knee has known foreign body in it which could obviously be causing pain but leg pain is more generalized. She is on a statin, but the pain has been present before we started that. Has had hypokalemia (likely secondary to chlorthalidone), but had the leg pain even before this was present; other electrolytes are occasionally a little off but not consistently enough to explain chronicity of pain. DVT bilateral US negative on first admission.  Things we still haven't ruled out- polymyalgia rheumatica (had elevated CRP in hospital but in setting of pnx), ischemia to legs.   I think we should get ESR, CRP, CBC, anti-Rh, anti-CCP to look for rheum etiologies, and consider sending to rheum.    Foot numbness: still haven't been able to figure this out either. A1C not consistent with level of neuropathy. B12 normal.     Carmen is going to schedule a phone visit for Veronica my next opening (August 18) so we can order these labs/continue to discuss how things are going. Will also try to reach out to TCU to see if they would be willing to add on labs.    I discussed with Dr. Cavanaugh, who agrees with the plan.    Daniella Orta MD MD PGY2  [facility]

## 2020-08-07 NOTE — TELEPHONE ENCOUNTER
LUIS DANIEL called Carmen back to discuss supports for her mom. She states that she is wondering about payment for the TCU as well as the reason for which she was admitted to the TCU. Carmen states she is the health care agent for her mother and has been very involved in her cares for the last few years.     LUIS DANIEL explained the insurance part first. Stated that it appears Veronica was in the hospital for 5 days. It also appears she has Medicare as a primary insurance and a supplement of a private [employer] policy. LUIS DANIEL indicates that the rule of thumb with Medicare is a 3 day qualifying hospital stay is required for Medicare to subsequently pay for a TCU stay. It appears that Veronica fulfilled that with 5 days at [facility]. Carmen states that her supplemental insurance covers the 20% that Medicare doesn't cover for most services. LUIS DANIEL stated they cannot speak to the supplemental in specific but if that is the case they should not worry too much about excessive and outstanding bills for the TCU.     To the specific diagnosis piece, this SW provided information and education around the variety of reasons in which a person can be sent to a TCU with the main reason being rehabilitation of some sort. What Carmen is confused about is what is causing her mother's weakness and failure to thrive. She indicates that she was part of a care conference with the TCU team yesterday and they indicate that today is the last day of PT. They are considering a readmission to the hospital if Veronica doesn't improve.     Veronica is currently at [facility] TCU. Currently, she is needing 24 hour cares. Carmen wants her mother to move in with her as she states she is best equipped to care for her mom at her home. Carmen states that at Veronica's home is Veronica's elderly partner who cannot take care of her, Veronica's son, and Veronica's daughter in law who requires her own PCA and cannot care for anyone else. Carmen states her home is a single level home that is safe and accessible. She  is going to discuss this with her mom.     Carmen was appreciative of the call. She will call [facility] back if she has further questions.     NEERAJ Wilcox

## 2020-08-28 NOTE — TELEPHONE ENCOUNTER
[facility] Family Medicine phone call message - order or referral request for patient:     Order or referral being requested: SKILLED NURSING        Additional Comments: 2 times a week for 2 weeks 1 time a week for 3 weeks and a 1 month follow-up for pain management and pressure ulcers prevention. The pt has sores on left heal and right lateral ankle. The nurse needs a call back to talk about knee pain the pt is right knee swelling        OK to leave a message on voice mail? Yes    Primary language: English      needed? No    Call taken on August 28, 2020 at 9:07 AM by Jameson Ulloa

## 2020-08-28 NOTE — TELEPHONE ENCOUNTER
Verbal orders given to HHN for SKILLED NURSING            2 times a week for 2 weeks 1 time a week for 3 weeks and a 1 month follow-up for pain management and pressure ulcers prevention.     The pt has sores on left heal and right lateral ankle.    Nurse reports patient continues to have bilateral knee pain pt has limited movement due to pain. Nurse reports some swelling with questionable fluid in knees. Nurse suggesting: course of prednisone a rheum or ortho referral     Patient home with Daughter Carmen    Note routed to Dr.Grosland Ellis RN

## 2020-08-31 NOTE — PROGRESS NOTES
Interprofessional Team Consultation Note     Requesting Provider: Dr. Leonard    Consultants:  Behavioral Health: Dr. Juarez  Care Coordination: Maricruz  PharmD: Dr. Alvarenga  Family Medicine Physicians: Dr. Leonard, Dr. Medina, and Dr. Betts    IDENTIFYING DATA/REASON FOR REFERRAL:  Veronica Dia is 80 year old female who is cared for by Dr. Leonard.? Dr. Leonard is requesting consultation related to getting patient to clinic. ?Relevant clinical information obtained from requesting PCP, interprofessional team members noted above and review of the medical record.     Patient Active Problem List   Diagnosis     Pulmonary nodules     Thyroid nodule     Hypokalemia     Pain in both lower extremities     Failure to thrive (0-17)     Current Outpatient Medications   Medication     acetaminophen (TYLENOL) 500 MG tablet     atorvastatin (LIPITOR) 10 MG tablet     metFORMIN (GLUCOPHAGE-XR) 500 MG 24 hr tablet     Misc. Devices (COMMODE BEDSIDE) MISC     order for DME     polyethylene glycol (MIRALAX) 17 g packet     No current facility-administered medications for this visit.        Topics Discussed:  Dr. Leonard knows this patient from the hospital. Patient is bed-bound due to pain in her legs. Have not been fully able to determine reason for pain. Suspicious it is of rheumatologic nature. Dr. Leonard last saw her at the hospital. At the time had pneumonia with elevated labs. Would like repeat labs. It has been difficult to get patient to come to clinic. Patient lives with her daughter who has a full-time job. Patient therefore is mostly alone at home, but patient prefers this than to a nursing home. Patient's daughter works on site at home and therefore gets to monitor and see patient throughout the day. Patient's sons also help with moving patient.     Patient receives home care nursing and nurse is worried about fluid in patient's legs. SW discussed complexity of patient's insurance and income  level which limits services available to her. Also discussed family and home dynamics known to her. Dr. Leonard that patient has reported her mood has been fine. Pain in her legs is her main frustration. Patient has declined PT in the past.      Recommendations/Action Items:  1. Patient has visit scheduled tomorrow with Dr. Leonard on 8/31/2020. SW will call patient and daughter to remind her of appointment and help address any barriers.   2. Visit will be blue-dotted for consultation with pharmacy since patient has had some med changes since hospital discharge.   3. Dr. Leonard will continue to monitor mood and give a PHQ-9 at next visit for screening of mood. Discussed that CPM assessment may be beneficial for pain management and behavioral health strategies. Dr. Leonard will discuss and offer this if beneficial.     Rocio Juarez, PhD     Disclaimer  The above treatment recommendations are based on consultation with the patient's primary care provider and a review of relevant information in EPIC.? I have not personally examined the patient.? All recommendations should be implemented with considerations of the patient's relevant prior history and current clinical status.  Please contact me with any questions about the care of this patient.

## 2020-08-31 NOTE — TELEPHONE ENCOUNTER
Verbal orders given to OT for: OT first visits with PT (co visits) and then will let pt gain strength with PT and then restart OT 9/28      Also fyi, pt's heart rate 107 and then checked again a little bit after and it was 104.     Note routed to Dr.Grosland Ellis RN

## 2020-08-31 NOTE — TELEPHONE ENCOUNTER
[facility] phone call message- general phone call:    Reason for call:     Verbal Orders    Action desired:     OT -  1x a week for 3 weeks and hold until 09/28/2020. 1x a week for 4 weeks.     Also fyi, pt's heart rate 107 and then checked again a little bit after and it was 104.     Return call needed: Yes    OK to leave a message on voice mail? Yes    Advised patient to response may take up to 2 business days: Yes    Primary language: English      needed? No    Call taken on August 31, 2020 at 2:45 PM by Lynn Kaufman CMA

## 2020-09-01 NOTE — TELEPHONE ENCOUNTER
[facility] phone call message- general phone call:    Reason for call:     Verbal Orders     Action desired:     PT - 2x a week for 3 weeks and 1x a week for 4 weeks for strengtening and manual therapy for range of motion and bed mobility and transfer training and education for pressures relief and covid-19 education prevention.         Return call needed: Yes    OK to leave a message on voice mail? Yes    Advised patient to response may take up to 2 business days: Yes    Primary language: English      needed? No    Call taken on September 1, 2020 at 4:41 PM by Lynn Kaufman CMA

## 2020-09-01 NOTE — TELEPHONE ENCOUNTER
[facility] phone call message- general phone call:    Reason for call: She needs to talk to a nurse.    Action desired: call back.    Return call needed: Yes    OK to leave a message on voice mail? Yes    Advised patient to response may take up to 2 business days: Yes    Primary language: English      needed? No    Call taken on September 1, 2020 at 11:17 AM by Benny Diamond

## 2020-09-01 NOTE — TELEPHONE ENCOUNTER
HHN reporting pt has a deep congested productive cough with rhonchi heard on ausculation.  Nurse states pt recently stopped smoking which might be a factor  Nurse requesting a chest xray which will be done at pt's home  Verbal order given for chest xray.    Note routed to Dr.Grosland Ellis RN

## 2020-09-01 NOTE — PROGRESS NOTES
I have reviewed and agree with the behavioral health fellow's summary and recommendations.  Jigna Burleson, PhD., LP

## 2020-09-02 NOTE — TELEPHONE ENCOUNTER
HHN reports patient will be taken to [facility] ER: declining health, not eating or drinking, bed bound, new pressure ulcer on coccyx.   Chest x ray faxed to clinic no acute cardiopulmonary processes,    Note routed to  and MARÍA Ellis RN.

## 2020-09-02 NOTE — TELEPHONE ENCOUNTER
Message left for PT giving Verbal orders for PT 2x a week for 3 weeks and 1x a week for 4 weeks for strengtening and manual therapy for range of motion and bed mobility and transfer training and education for pressures relief and covid-19 education prevention.     Note routed to Dr.Grosland Ellis RN

## 2020-09-02 NOTE — PROGRESS NOTES
This SW was unable to complete outreach in time for a reminder call. Veronica mills showed the appointment with  on 09/01/2020.     SW called Veronica's daughter, and caretaker, Carmen to assist with rescheduling and troubleshooting any barriers. Carmen answered and states the home care RN is at the home currently. They are deciding if Veronica needs to go back to the hospital due to the pain and no increase in strength. SW indicated that  had mentioned wanting to do further labs and workout to get to the root of the issue. Carmen indicated they would finish the visit with the home care RN and then make a decision about going back to the ED.     LUIS DANIEL will call Carmen tomorrow to schedule a f/u clinic visit as well.     NEERAJ Wilcox

## 2020-09-02 NOTE — TELEPHONE ENCOUNTER
[facility] phone call message- general phone call:    Reason for call: the home care nurse called to ask for the Dr to call her or the nurse. They very concerned about the Pt condition and need a call  back asap    Return call needed: Yes    OK to leave a message on voice mail? Yes    Primary language: English      needed? No    Call taken on September 2, 2020 at 2:19 PM by Jameson Ulloa

## 2020-09-03 NOTE — PROGRESS NOTES
This SW reached out to Carmen, patient's daughter, to inquire if Veronica was hospitalized. She states that they did not go to the ED yesterday, as was advised by the home RN. Carmen states that Veronica was refusing and talked Carmen out of taking her. Carmen told her mom that she would give her a day to see if she improves and starts eating and drinking more. So far today, there hasn't been much improvement. Carmen thinks they will go to [facility] ED later today, as patient is currently living with her daughter in [place].    LUIS DANIEL agree's to call Carmen back tomorrow to follow up on the status of her mom and assist with coordinating PCP f/u at that time.     Carmen states that transportation to the clinic will be difficult. She hasn't been able to independently transport her mother since before the last hospital admission. Carmen has used the non emergency transport services for this. Veronica's insurance doesn't cover the cost of it so each transport is close to $100, for a one way trip. Carmen states if that is the only way to get her to where she needs to be, that is what they'll need to do.     NEERAJ Wilcox

## 2020-09-03 NOTE — TELEPHONE ENCOUNTER
[facility] phone call message- general phone call:    Reason for call:     Fyi REFUSED TO GO TO ER. OFFER ADDITIONAL NURSING VISIT.     FYI, Pt's refused to go to Emergency room last night. Tierney offered additional nursing visit and pt refused.     Action desired:    Asking for recommendation to accommodate pt's pain.     Return call needed: Yes    OK to leave a message on voice mail? Yes    Advised patient to response may take up to 2 business days: Yes    Primary language: English      needed? No    Call taken on September 3, 2020 at 11:42 AM by Lynn Kaufman CMA

## 2020-09-04 NOTE — TELEPHONE ENCOUNTER
[facility] phone call message - order or referral request for patient:     Order or referral being requested: ORDERS      Additional Comments: Orders for skilled nursing for today she was recommended  to go to the hospital but she refuses so they want to go check on her to make sure se is ok.    OK to leave a message on voice mail? Yes       Primary language: English      needed? No    Call taken on September 4, 2020 at 10:32 AM by Benny Diamond

## 2020-09-04 NOTE — TELEPHONE ENCOUNTER
Verbal order for extra SN visit given to HHN along with request for lab work   BMP  CBC  A1C  TSH  ESR  CRP    Note routed to Dr.Grosland Ellis RN

## 2020-09-04 NOTE — TELEPHONE ENCOUNTER
[facility] phone call message- general phone call:    Reason for call: her mother is now living out in [place] with her and she feels like she doesn't need to bring her back to [facility] for any apts, she is in out of hospitals no one is finding anything out for her or what's wrong with her. So she is going to start looking at clinics out by her because it seems like no on knows what's wrong with her mother.    Action desired: call back if questions.    Return call needed: Yes    OK to leave a message on voice mail? Yes    Advised patient to response may take up to 2 business days: Yes    Primary language: English      needed? No    Call taken on September 4, 2020 at 11:33 AM by Benny Diamond

## 2020-09-04 NOTE — TELEPHONE ENCOUNTER
[facility] phone call message- general phone call:    Reason for call:    FYI    Action desired:     Visit today did not go as planned. Unable to do labs. Pt did not drink. Pt does look better today, but pt is still not eating and drinking as much.     Return call needed: Yes    OK to leave a message on voice mail? Yes    Advised patient to response may take up to 2 business days: Yes    Primary language: English      needed? No    Call taken on September 4, 2020 at 2:37 PM by Lynn Kaufman CMA

## 2020-09-04 NOTE — PROGRESS NOTES
LUIS DANIEL reached out to Carmen to f/u on if Veronica was taken to the ED or hospitalized. She answered but indicated she is at work and was unable to talk. She will call this SW back at a later time.     Unsure if patient is home or hospitalized.     NEERAJ Wilcox

## 2020-10-12 NOTE — TELEPHONE ENCOUNTER
HHN states patient has requested hospice home care,nurse reports pt has no desire to get out of bed no desire to go to the hospital and that she has congested lung sounds.  HHN spoke with daughter who states they have not found a new provider yet and would like to continue care at [facility]   Verbal orders for hospice care to consult given    Note routed to Dr. Horacio Ellis RN

## 2020-10-12 NOTE — TELEPHONE ENCOUNTER
[facility] phone call message- general phone call:    Reason for call: she seen [facility] over the weekend for resumption of care but the patient would actually like hospice so they would like a call back for a hospice consult.    Action desired: call back.    Return call needed: Yes    OK to leave a message on voice mail? Yes    Advised patient to response may take up to 2 business days: Yes       Primary language: English      needed? No    Call taken on October 12, 2020 at 8:31 AM by Benny Diamond

## 2020-10-12 NOTE — TELEPHONE ENCOUNTER
[facility] phone call message - order or referral request for patient:     Order or referral being requested: Hospice       Additional Comments: Please fax orders for hospice to [facility] at [phone]    OK to leave a message on voice mail? Yes    Primary language: English      needed? No    Call taken on October 12, 2020 at 2:12 PM by Christiane Herrmann

## 2020-10-13 NOTE — TELEPHONE ENCOUNTER
[facility] phone call message- general phone call:    Reason for call: Doctor should be sending over hospice order to evalute and treat. Hospice is seeing family this afternoon and if it can be sent this morning.     Return call needed: Yes    OK to leave a message on voice mail? Yes    Primary language: English      needed? No    Call taken on October 13, 2020 at 9:33 AM by Grisel Flores-Cardona

## 2020-10-13 NOTE — TELEPHONE ENCOUNTER
Answering Service    I received a page from the answering service 6:15 PM re: Hospice Orders.     Hospice RN Jaelyn, from [facility], called on behalf of patient, Mrs. Veronica Dia regarding needing verbal orders from an attending for Certification of Terminal Illness (CTI) in order for the patient to qualify for hospice cares.     Review of records show an admission to [facility] to our hospital service in July 2020 for failure to thrive and several other diagnoses. Per Hospice RN, the patient has had 3 hospitalizations since March 2020.     Verbal order for CTI was obtained by this resident physician from attending physician, Dr. Jensen Grant and relayed to the hospice RN. However, this patient has not been officially seen by any of our clinic providers since her hospitalization in July. Additionally, we do not have Care Everywhere access to other hospitalizations that may provide further information regarding terminal diagnoses. It would be very helpful to hear the evaluation from the hospice RN.     I will have clinic RNRhonda, call hospice RN or clinic tomorrow morning to find out more information about the home visit with the patient and determine if the patient needs a virtual clinic visit for care coordination.      Jaelyn Tejada MD PGY2  [facility] Medicine Residency  10/13/2020

## 2020-10-14 NOTE — TELEPHONE ENCOUNTER
Spoke with Daughter Carmen regarding scheduling a virtual visit with her mother; Carmen states that scheduling a virtual visit will be difficult due to her work schedule. She states she does not feel an appt is necessary at this point daughter wants to proceed with hospice. She continues to voice her frustration of the treatment her mother received the numerous appt the numerous test with no explanation of her mother's condition. She states her mother saw a doctor in [place] that found a tumor on the pt's spine that had been there for a long time. Patient has had surgery and now her condition warrants hospice care.     Note routed to Dr.Grosland Rhonda POWERS

## 2020-10-16 NOTE — TELEPHONE ENCOUNTER
Talked with daughter again. 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 We discussed that a benign spinal meningioma does not meet criteria for a hospice diagnosis, but that a palliative care consult could be appropriate. Carmen is confused as she says that they were already cleared for hospice.    Spoke over the phone with Cara from [facility], who tells me that after they had made the request from us to give a diagnosis for hospice care, which we could not do as I did not know of any appropriate diagnosis (I haven't seen the patient since her last hospitalization in September, nor did we have the full records of that hospitalization), they had gotten the diagnosis of anoxic brain injury from their own medical director. Per Cara, [facility] does not need anything further from us.

## 2020-10-16 NOTE — TELEPHONE ENCOUNTER
Left message for patient's daughter Carmen explaining that we need a diagnosis and a doctor would need to evaluate her in order to appropriately say that she is a candidate for hospice. Asked her to make an appointment, which could be telephone/virtual visit.    Will attempt calling later today.     Daniella Orta MD MD PGY2  [facility]

## 2020-11-16 NOTE — TELEPHONE ENCOUNTER
[facility] phone call message- general phone call:    Reason for call: she would like a call back from a nurse re her mother.    Action desired: call back.    Return call needed: Yes    OK to leave a message on voice mail? Yes    Advised patient to response may take up to 2 business days: Yes    Primary language: English      needed? No    Call taken on November 16, 2020 at 12:26 PM by Benny Diamond

## 2020-11-16 NOTE — TELEPHONE ENCOUNTER
"Daughter calling regarding mothers medication the amount of opioids she is receiving and how her mother is responding \"like a zombie\" Daughter asking who has been ordering pts meds  Daughter informed the at this time hospice providers are ordering meds. Daughter stated hospice nurse was calling she voiced appreciation for call and ended call.    Note routed to Dr.Grosland Ellis RN  "

## 2020-12-11 ENCOUNTER — TELEPHONE (OUTPATIENT)
Dept: FAMILY MEDICINE | Facility: CLINIC | Age: 80
End: 2020-12-11

## 2020-12-11 NOTE — TELEPHONE ENCOUNTER
[facility] phone call message- general phone call:    Reason for call:     Carlos GARDUNO has passed away on 12/08/2020.      Action desired:    FYI    Return call needed: Yes    OK to leave a message on voice mail? Yes    Advised patient to response may take up to 2 business days: Yes    Primary language: English      needed? No    Call taken on December 11, 2020 at 11:15 AM by Lynn Kaufman CMA

## 2021-01-04 ENCOUNTER — HEALTH MAINTENANCE LETTER (OUTPATIENT)
Age: 81
End: 2021-01-04

## 2021-07-20 VITALS
WEIGHT: 153 LBS | HEIGHT: 68 IN | RESPIRATION RATE: 16 BRPM | OXYGEN SATURATION: 95 % | SYSTOLIC BLOOD PRESSURE: 93 MMHG | TEMPERATURE: 98.3 F | RESPIRATION RATE: 18 BRPM | SYSTOLIC BLOOD PRESSURE: 131 MMHG | OXYGEN SATURATION: 97 % | WEIGHT: 153 LBS | RESPIRATION RATE: 18 BRPM | HEART RATE: 74 BPM | RESPIRATION RATE: 20 BRPM | DIASTOLIC BLOOD PRESSURE: 85 MMHG | RESPIRATION RATE: 18 BRPM | SYSTOLIC BLOOD PRESSURE: 146 MMHG | HEIGHT: 68 IN | RESPIRATION RATE: 17 BRPM | HEART RATE: 82 BPM | SYSTOLIC BLOOD PRESSURE: 136 MMHG | DIASTOLIC BLOOD PRESSURE: 53 MMHG | HEART RATE: 87 BPM | TEMPERATURE: 98.3 F | OXYGEN SATURATION: 97 % | DIASTOLIC BLOOD PRESSURE: 74 MMHG | TEMPERATURE: 96.9 F | DIASTOLIC BLOOD PRESSURE: 64 MMHG | HEIGHT: 68 IN | TEMPERATURE: 98.7 F | HEIGHT: 68 IN | DIASTOLIC BLOOD PRESSURE: 67 MMHG | DIASTOLIC BLOOD PRESSURE: 80 MMHG | BODY MASS INDEX: 23.19 KG/M2 | WEIGHT: 153 LBS | BODY MASS INDEX: 23.19 KG/M2 | SYSTOLIC BLOOD PRESSURE: 126 MMHG | BODY MASS INDEX: 20.85 KG/M2 | HEART RATE: 94 BPM | SYSTOLIC BLOOD PRESSURE: 160 MMHG | WEIGHT: 153 LBS | OXYGEN SATURATION: 94 % | TEMPERATURE: 98.7 F | TEMPERATURE: 98.5 F | OXYGEN SATURATION: 95 % | BODY MASS INDEX: 23.19 KG/M2 | HEART RATE: 111 BPM | OXYGEN SATURATION: 98 % | BODY MASS INDEX: 23.19 KG/M2 | WEIGHT: 137.6 LBS | RESPIRATION RATE: 19 BRPM | HEIGHT: 68 IN | OXYGEN SATURATION: 96 % | WEIGHT: 153 LBS | BODY MASS INDEX: 23.19 KG/M2 | HEART RATE: 92 BPM | HEIGHT: 68 IN | BODY MASS INDEX: 23.19 KG/M2 | HEIGHT: 68 IN | TEMPERATURE: 98.3 F | WEIGHT: 153 LBS | HEART RATE: 84 BPM | SYSTOLIC BLOOD PRESSURE: 122 MMHG | DIASTOLIC BLOOD PRESSURE: 52 MMHG

## 2021-07-20 VITALS
DIASTOLIC BLOOD PRESSURE: 82 MMHG | OXYGEN SATURATION: 95 % | SYSTOLIC BLOOD PRESSURE: 128 MMHG | TEMPERATURE: 97.1 F | HEART RATE: 80 BPM | OXYGEN SATURATION: 97 % | DIASTOLIC BLOOD PRESSURE: 82 MMHG | SYSTOLIC BLOOD PRESSURE: 126 MMHG | OXYGEN SATURATION: 96 % | SYSTOLIC BLOOD PRESSURE: 129 MMHG | SYSTOLIC BLOOD PRESSURE: 124 MMHG | DIASTOLIC BLOOD PRESSURE: 82 MMHG | OXYGEN SATURATION: 97 % | SYSTOLIC BLOOD PRESSURE: 126 MMHG | SYSTOLIC BLOOD PRESSURE: 128 MMHG | HEART RATE: 77 BPM | DIASTOLIC BLOOD PRESSURE: 82 MMHG | SYSTOLIC BLOOD PRESSURE: 132 MMHG | DIASTOLIC BLOOD PRESSURE: 82 MMHG | HEART RATE: 81 BPM | OXYGEN SATURATION: 97 % | DIASTOLIC BLOOD PRESSURE: 80 MMHG | OXYGEN SATURATION: 96 % | SYSTOLIC BLOOD PRESSURE: 130 MMHG | TEMPERATURE: 97.9 F | OXYGEN SATURATION: 97 % | HEART RATE: 83 BPM | OXYGEN SATURATION: 96 % | HEART RATE: 82 BPM | DIASTOLIC BLOOD PRESSURE: 80 MMHG | HEART RATE: 80 BPM | DIASTOLIC BLOOD PRESSURE: 82 MMHG

## 2021-07-20 VITALS
DIASTOLIC BLOOD PRESSURE: 82 MMHG | DIASTOLIC BLOOD PRESSURE: 80 MMHG | SYSTOLIC BLOOD PRESSURE: 126 MMHG | HEART RATE: 81 BPM | SYSTOLIC BLOOD PRESSURE: 130 MMHG | SYSTOLIC BLOOD PRESSURE: 128 MMHG | OXYGEN SATURATION: 97 % | DIASTOLIC BLOOD PRESSURE: 82 MMHG | OXYGEN SATURATION: 96 % | HEART RATE: 78 BPM | OXYGEN SATURATION: 97 %

## 2021-07-20 NOTE — LETTER
Letter by Trevor Reeder CNP at      Author: Trevor Reeder CNP Service: -- Author Type: --    Filed:  Encounter Date: 2020 Status: (Other)         Patient: Veronica Dia   MR Number: [id number]   YOB: 1940   Date of Visit: 2020     [facility]    Name:   Veronica Dia  : 1940  Facility:   [facility] [[id number]]   Room: /210  Code Status: FULL CODE -   Fac type:   SNF (Skilled Nursing Facility, TCU) -     PCP:  Juve Lundberg MD    CHIEF COMPLAINT / REASON FOR VISIT:  Chief Complaint   Patient presents with   ? Discharge Summary     TCU discharge after hospitalization for leg pain, failure to thrive, hypokalemia, hypomagnesemia, hypophosphatemia, and right lower lobe pneumonia.      [facility] from 2020 until 2020 (failure to thrive/severe malnutrition, community-acquired pneumonia, hypokalemia, hypomagnesemia, and hypophosphatemia)    Patient was last seen by me on 2020.      HPI: Veronica is a 80 y.o. female who presented to the ED on 2020 with leg pain and failure to thrive, who was found to have hypokalemia (2.4), hypomagnesemia, and hypophosphatemia.  Electrolytes were replaced.  Physical and occupational therapies and care management were consulted.  She was also treated for community-acquired pneumonia with azithromycin and ceftriaxone, then transitioned to Augmentin and azithromycin (ending on 2020).  A head CT revealed chronic microvascular changes and volume loss but did not reveal any acute intracranial process.  The patient's leg pain was able to be adequately managed with oral and topical medications, and she was discharged to the TCU on 2020.     She had been on chlorthalidone 5 mg daily prior to admission.  They felt this likely contributed to her hypokalemia and was discontinued.  Blood pressures were adequately controlled without medications  "inpatient.      TCU ISSUES    Disposition: Her mantra has been that her legs \"hurt pretty bad,\" that the legs are just \"killing me,\" and that she cannot stand on her feet, and her \"toes just don't move.\"  Pain, apparently located in the thighs, right above the knees bilaterally, is increased when sitting upright.  She is really unable to describe the pain other than telling me that \"it just hurts,\" no matter how many different ways she is asked.  The result has been just more frustration.  The right side may or may not be a bit worse than the left.  X-rays obtained in the hospital show a small loose foreign body in the right knee.  Without really any changes, and my last visit she told me that her legs are \"kind of sore but not as sore as when I came here.\"    Thiamine: I earlier noted that she is on thiamine, although I did not see a diagnosis of alcoholism/alcohol abuse.  I believe this is a treatment for something other than this; however, I am still wondering about possible Warnicke's.  She tells me she drank \"years ago? Maybe 10 years.\"  She liked [employer] and drank most days after work.  She also says she used to be a smoker but stopped when she started coughing \"maybe 2, 3 months ago.\"    Diabetes mellitus type 2: Interestingly, the hospital discharge summary notes that prior to admission she was receiving 500 mg of metformin daily for diabetes mellitus type 2; however, the patient told me, \"I didn't even know I was [a diabetic].  I'm not a sweet eater.\"  She seemed unaware that she was receiving metformin.  We did order speech therapy to evaluate and treat for cognition.  I am told she does poorly on short-term recall (words after 5 minutes).    Imaging studies: A CT of the head to evaluate for numbness and motor weakness did reveal chronic microvascular changes with mild generalized volume loss with preferential atrophy of the anterior and medial temporal lobes, but no acute intracranial " "process.    Therapy/pain: Therapy did report bilateral muscle wasting and that she cannot dorsiflex the right foot.  She has been bed ridden for at least the last 2 to 3 months, and it is torture for her and apparently previously active person.  She is hardly eating and still complaining of leg pain when up, okay when lying down.  Considering sending her to the pain clinic or to rheumatology.  She has not been to either. She has, apparently, been a resident in several facilities.  As of 08/07/2020, she is no longer receiving therapy.  The PT manager stated, \"we can't even touch her leg.\"  They also noted that she has a complete lack of motivation.    Muscle spasms/neuropathy: Rather than go with a narcotic analgesic, we initiated cyclobenzaprine 10 mg 3 times daily as needed.  However, she has been refusing this, and my assumption is that it is ineffective.  Otherwise, she is only receiving 500 to 1000 mg of acetaminophen every 4 hours as needed.  We added gabapentin 100 mg 3 times daily and also obtained a CMP.  Of particular note is a total protein of 4.8, albumin 2.2, indicating moderate protein-calorie malnutrition.  At my last visit, we increased her gabapentin from 100 mg 3 times daily to 200 mg 3 times daily.  Two days ago, we increased the dose to 300 mg 3 times daily.  We then did a gradual taper up to 600 mg 3 times daily.     She tells me, \"my feet are still the same.\"    Pressure wound: Nursing staff report a left heel pressure area (not open), but she is already wearing bilateral Rooke boots.  It is moderately painful.  She does indicate that the bottoms of her feet feel numb.    Discharge planning: Expected discharge date of 08/25/2020.  She will require home health aide, home health nurse, PT, OT, and .      ROS: No headaches, chest pains, dizziness or dyspnea, dysuria, constipation or diarrhea, difficulty chewing or swallowing.    Past Medical History:   Diagnosis Date   ? Hypertension  "   ? Protein-calorie malnutrition, moderate (H) 8/9/2020              Family History   Problem Relation Age of Onset   ? Acute Myocardial Infarction Neg Hx      Social History     Socioeconomic History   ? Marital status:      Spouse name: Not on file   ? Number of children: Not on file   ? Years of education: Not on file   ? Highest education level: Not on file   Occupational History   ? Not on file   Social Needs   ? Financial resource strain: Not on file   ? Food insecurity     Worry: Not on file     Inability: Not on file   ? Transportation needs     Medical: Not on file     Non-medical: Not on file   Tobacco Use   ? Smoking status: Current Some Day Smoker     Packs/day: 2.86     Years: 61.00     Pack years: 174.46   ? Smokeless tobacco: Never Used   Substance and Sexual Activity   ? Alcohol use: Never     Frequency: Never   ? Drug use: Never   ? Sexual activity: Not on file   Lifestyle   ? Physical activity     Days per week: Not on file     Minutes per session: Not on file   ? Stress: Not on file   Relationships   ? Social connections     Talks on phone: Not on file     Gets together: Not on file     Attends [employer] service: Not on file     Active member of club or organization: Not on file     Attends meetings of clubs or organizations: Not on file     Relationship status: Not on file   ? Intimate partner violence     Fear of current or ex partner: Not on file     Emotionally abused: Not on file     Physically abused: Not on file     Forced sexual activity: Not on file   Other Topics Concern   ? Not on file   Social History Narrative   ? Not on file       MEDICATIONS: Reviewed from the MAR, physician orders, and/or earlier progress notes.  Post Discharge Medication Reconciliation Status: medication reconciliation previously completed during another office visit.      Current Outpatient Medications   Medication Sig   ? acetaminophen (TYLENOL) 650 MG CR tablet Take 650 mg by mouth every morning.   ?  "atorvastatin (LIPITOR) 10 MG tablet Take 1 tablet (10 mg total) by mouth at bedtime.   ? cyclobenzaprine (FLEXERIL) 10 MG tablet Take 10 mg by mouth 3 (three) times a day as needed for muscle spasms.   ? gabapentin (NEURONTIN) 100 MG capsule Take 600 mg by mouth 3 (three) times a day. Increase gabapentin from 300 mg 3 times daily to 400 mg 3 times daily for 3 days, then  Increase gabapentin from 400 mg 3 times daily to 500 mg 3 times daily for 3 days, then  Increase gabapentin from 500 mg 3 times daily to 600 mg 3 times daily   ? megestroL (MEGACE) 400 mg/10 mL (40 mg/mL) Take 800 mg by mouth daily.   ? metFORMIN (GLUCOPHAGE-XR) 500 MG 24 hr tablet Take 500 mg by mouth daily.   ? multivitamin with minerals (THERA-M) 9 mg iron-400 mcg Tab tablet Take 1 tablet by mouth daily.   ? polyethylene glycol (MIRALAX) 17 gram packet Take 1 packet (17 g total) by mouth daily.   ? thiamine 100 MG tablet Take 1 tablet (100 mg total) by mouth daily.     ALLERGIES: No Known Allergies    DIET: Regular, regular texture, thin liquids.  Mighty shake sugar-free.    Vitals:    08/24/20 1440   BP: 131/53   Pulse: 82   Resp: 18   Temp: 98.3  F (36.8  C)   SpO2: 95%   Weight: 153 lb (69.4 kg)   Height: 5' 7.5\" (1.715 m)   Questionable weight, as she has also been documented as weighing 20 pounds more and about 20 pounds less.  Pulse is generally in the 80s to 90s, occasionally more or less.  Body mass index is 23.61 kg/m .    EXAMINATION:   General: Fairly pleasant, alert, and conversant elderly female, looking much younger than her stated age, lying in bed, in NAD.  Head: Normocephalic and atraumatic.   Eyes: PERRLA, sclerae clear.   ENT: Moist oral mucosa.   Cardiovascular: Previously: Regular rate and rhythm with no appreciable murmur.  Chest not auscultated today due to ongoing COVID-19 precautions.  Respiratory: Previously: Loose, phlegmy cough has returned.  Lungs are still clear to auscultation bilaterally.  Chest not auscultated " today due to ongoing COVID-19 precautions.  Abdomen: Nondistended.   Musculoskeletal/Extremities: Age-related degenerative joint disease.  Left trace pedal edema.  Integument: No rashes, clinically significant lesions, or skin breakdown.   Cognitive/Psychiatric: Generally appears alert and oriented, although I question the possibility of some cognitive deficits, and she has been seen by SLP.  When she is not painful, affect is euthymic.    DIAGNOSTICS:     Results for orders placed or performed in visit on 08/05/20   Comprehensive Metabolic Panel   Result Value Ref Range    Sodium 142 136 - 145 mmol/L    Potassium 4.1 3.5 - 5.0 mmol/L    Chloride 107 98 - 107 mmol/L    CO2 25 22 - 31 mmol/L    Anion Gap, Calculation 10 5 - 18 mmol/L    Glucose 94 70 - 125 mg/dL    BUN 8 8 - 28 mg/dL    Creatinine 0.55 (L) 0.60 - 1.10 mg/dL    GFR MDRD Af Amer >60 >60 mL/min/1.73m2    GFR MDRD Non Af Amer >60 >60 mL/min/1.73m2    Bilirubin, Total 0.3 0.0 - 1.0 mg/dL    Calcium 8.2 (L) 8.5 - 10.5 mg/dL    Protein, Total 4.8 (L) 6.0 - 8.0 g/dL    Albumin 2.2 (L) 3.5 - 5.0 g/dL    Alkaline Phosphatase 109 45 - 120 U/L    AST 17 0 - 40 U/L    ALT 15 0 - 45 U/L     Lab Results   Component Value Date    WBC 10.8 07/27/2020    HGB 9.0 (L) 07/27/2020    HCT 28.1 (L) 07/27/2020    MCV 85 07/27/2020     (H) 07/28/2020     CrCl cannot be calculated (Patient's most recent lab result is older than the maximum 10 days allowed.).  Lab Results   Component Value Date    HGBA1C 7.3 (H) 05/10/2020       ASSESSMENT/Plan:      ICD-10-CM    1. Pain in both lower extremities  M79.604     M79.605    2. Idiopathic peripheral neuropathy  G60.9    3. Short-term memory loss  R41.3    4. Failure to thrive (0-17)  R62.51    5. Essential hypertension, benign  I10    6. Atrophy of muscle of multiple sites  M62.59    7. Unable to balance when standing  R27.8    8. Hypokalemia  E87.6      DISCHARGE PLAN/FACE TO FACE:  I certify that this patient is under my  care and that I had a face-to-face encounter that meets the physician face-to-face encounter requirements with this patient.     Date of Face-to-Face Encounter:  08/24/2020     I certify that, based on my findings, the following services are medically necessary home health services: Home health aide, home health nurse, home PT, home OT, visiting     My clinical findings support the need for the above skilled services because: (Please write a brief narrative summary that describes what the RN, PT, SLP, or other services will be doing in the home. A list of diagnoses in this section does not meet the CMS requirements): Home health aide to assist with ADLs such as bathing, home health nurse for medication management, home PT and OT to continue therapies in the home setting, and  to work out details of her vulnerable adult status.    This patient is homebound because: (Please write a brief narrative summmary describing the functional limitations as to why this patient is homebound and specifically what makes this patient homebound.):  Patient is not only nonambulatory, she is unable to sit for very long and is not only homebound but bedbound.  Also, above services necessarily need to be performed in the home to be of benefit.    The patient is, or has been, under my care and I have initiated the establishment of the plan of care. This patient will be followed by a physician who will periodically review the plan of care.  Initial follow-up should be within 7-10 days.    Approximate time spent with this patient was greater than 30 minutes with greater than 50% spent in discussions regarding services required upon discharge.    The above has been created using voice recognition software. Please be aware that this may unintentionally  produce inaccuracies and/or nonsensical sentences.      Electronically signed by: Trevor Reeder, PEDRO

## 2021-07-20 NOTE — LETTER
Letter by Trevor Reeder CNP at      Author: Trevor Reeder CNP Service: -- Author Type: --    Filed:  Encounter Date: 2020 Status: (Other)         Patient: Veronica Dia   MR Number: [id number]   YOB: 1940   Date of Visit: 2020     [facility]    Name:   Veronica Dia  : 1940  Facility:   [facility] [[id number]]   Room:   Code Status: FULL CODE -   Fac type:   SNF (Skilled Nursing Facility, TCU) -     PCP:  Juve Lundberg MD    CHIEF COMPLAINT / REASON FOR VISIT:  Chief Complaint   Patient presents with   ? Follow-up     TCU follow-up after hospitalization for leg pain, failure to thrive, hypokalemia, hypomagnesemia, hypophosphatemia, and right lower lobe pneumonia.      [facility] from 2020 until 2020 (failure to thrive/severe malnutrition, community-acquired pneumonia, hypokalemia, hypomagnesemia, and hypophosphatemia)    Patient was last seen by me on 08/10/2020.      HPI: Veronica is a 80 y.o. female who presented to the ED on 2020 with leg pain and failure to thrive, who was found to have hypokalemia (2.4), hypomagnesemia, and hypophosphatemia.  Electrolytes were replaced.  Physical and occupational therapies and care management were consulted.  She was also treated for community-acquired pneumonia with azithromycin and ceftriaxone, then transitioned to Augmentin and azithromycin (ending on 2020).  A head CT revealed chronic microvascular changes and volume loss but did not reveal any acute intracranial process.  The patient's leg pain was able to be adequately managed with oral and topical medications, and she was discharged to the TCU on 2020.     She had been on chlorthalidone 5 mg daily prior to admission.  They felt this likely contributed to her hypokalemia and was discontinued.  Blood pressures were adequately controlled without medications inpatient.      TCU  "ISSUES    Disposition: Her mantra has been that her legs \"hurt pretty bad,\" that the legs are just \"killing me,\" and that she cannot stand on her feet, and her \"toes just don't move.\"  Pain, apparently located right above the knees bilaterally, is increased when sitting upright.  She is really unable to describe the pain other than telling me that \"it just hurts,\" no matter how many different ways she is asked.  The result has been just more frustration.  The right side may or may not be a bit worse than the left.  X-rays obtained in the hospital show a small loose foreign body in the right knee.  Without really any changes, and my last visit she told me that her legs are \"kind of sore but not as sore as when I came here.\"    I earlier noted that she is on thiamine, although I did not see a diagnosis of alcoholism/alcohol abuse.  I believe this is a treatment for something other than this; however, I am still wondering about possible Warnicke's.  She tells me she drank \"years ago? Maybe 10 years.\"  She liked [employer] and drank most days after work.  She also says she used to be a smoker but stopped when she started coughing \"maybe 2, 3 months ago.\"    Interestingly, the hospital discharge summary notes that prior to admission she was receiving 500 mg of metformin daily for diabetes mellitus type 2; however, the patient told me, \"I didn't even know I was [a diabetic].  I'm not a sweet eater.\"  She seemed unaware that she was receiving metformin.  We did order speech therapy to evaluate and treat for cognition.  I am told she does poorly on short-term recall (words after 5 minutes).    A CT of the head to evaluate for numbness and motor weakness did reveal chronic microvascular changes with mild generalized volume loss with preferential atrophy of the anterior and medial temporal lobes, but no acute intracranial process.    Therapy did report bilateral muscle wasting and that she cannot dorsiflex the right " "foot.  She has been bed ridden for at least the last 2 to 3 months, and it is torture for her and apparently previously active person.  She is hardly eating and still complaining of leg pain when up, okay when lying down.  Considering sending her to the pain clinic or to rheumatology.  She has not been to either. She has, apparently, been a resident in several facilities.  As of 08/07/2020, she is no longer receiving therapy.  The PT manager stated, \"we can't even touch her leg.\"  They also noted that she has a complete lack of motivation.    Rather than go with a narcotic analgesic, we initiated cyclobenzaprine 10 mg 3 times daily as needed.  However, she has been refusing this, and my assumption is that it is ineffective.  Otherwise, she is only receiving 500 to 1000 mg of acetaminophen every 4 hours as needed.  We added gabapentin 100 mg 3 times daily and also obtained a CMP.  Of particular note is a total protein of 4.8, albumin 2.2, indicating moderate protein-calorie malnutrition.  At my last visit, we increased her gabapentin from 100 mg 3 times daily to 200 mg 3 times daily.  Two days ago, we increased the dose to 300 mg 3 times daily.  We will do a gradual taper up to 600 mg 3 times daily.    Nursing staff report a left heel pressure area (not open), but she is already wearing bilateral Rooke boots.  She does indicate that the bottoms of her feet feel numb.      ROS: No headaches, chest pains, dizziness or dyspnea, dysuria, constipation or diarrhea, difficulty chewing or swallowing.    Past Medical History:   Diagnosis Date   ? Hypertension    ? Protein-calorie malnutrition, moderate (H) 8/9/2020              Family History   Problem Relation Age of Onset   ? Acute Myocardial Infarction Neg Hx      Social History     Socioeconomic History   ? Marital status:      Spouse name: Not on file   ? Number of children: Not on file   ? Years of education: Not on file   ? Highest education level: Not on file "   Occupational History   ? Not on file   Social Needs   ? Financial resource strain: Not on file   ? Food insecurity     Worry: Not on file     Inability: Not on file   ? Transportation needs     Medical: Not on file     Non-medical: Not on file   Tobacco Use   ? Smoking status: Current Some Day Smoker     Packs/day: 2.86     Years: 61.00     Pack years: 174.46   ? Smokeless tobacco: Never Used   Substance and Sexual Activity   ? Alcohol use: Never     Frequency: Never   ? Drug use: Never   ? Sexual activity: Not on file   Lifestyle   ? Physical activity     Days per week: Not on file     Minutes per session: Not on file   ? Stress: Not on file   Relationships   ? Social connections     Talks on phone: Not on file     Gets together: Not on file     Attends [employer] service: Not on file     Active member of club or organization: Not on file     Attends meetings of clubs or organizations: Not on file     Relationship status: Not on file   ? Intimate partner violence     Fear of current or ex partner: Not on file     Emotionally abused: Not on file     Physically abused: Not on file     Forced sexual activity: Not on file   Other Topics Concern   ? Not on file   Social History Narrative   ? Not on file       MEDICATIONS: Reviewed from the MAR, physician orders, and/or earlier progress notes.  Post Discharge Medication Reconciliation Status: medication reconciliation previously completed during another office visit.    Updated by me today (08/12/2020) with an increase in gabapentin from 300 mg 3 times daily to 600 mg 3 times daily reflected below.  Current Outpatient Medications   Medication Sig   ? acetaminophen (TYLENOL) 650 MG CR tablet Take 650 mg by mouth every morning.   ? atorvastatin (LIPITOR) 10 MG tablet Take 1 tablet (10 mg total) by mouth at bedtime.   ? cyclobenzaprine (FLEXERIL) 10 MG tablet Take 10 mg by mouth 3 (three) times a day as needed for muscle spasms.   ? gabapentin (NEURONTIN) 100 MG capsule  "Take 600 mg by mouth 3 (three) times a day. Increase gabapentin from 300 mg 3 times daily to 400 mg 3 times daily for 3 days, then  Increase gabapentin from 400 mg 3 times daily to 500 mg 3 times daily for 3 days, then  Increase gabapentin from 500 mg 3 times daily to 600 mg 3 times daily   ? megestroL (MEGACE) 400 mg/10 mL (40 mg/mL) Take 800 mg by mouth daily.   ? metFORMIN (GLUCOPHAGE-XR) 500 MG 24 hr tablet Take 500 mg by mouth daily.   ? multivitamin with minerals (THERA-M) 9 mg iron-400 mcg Tab tablet Take 1 tablet by mouth daily.   ? polyethylene glycol (MIRALAX) 17 gram packet Take 1 packet (17 g total) by mouth daily.   ? thiamine 100 MG tablet Take 1 tablet (100 mg total) by mouth daily.     ALLERGIES: No Known Allergies    DIET: Regular, regular texture, thin liquids.  Mighty shake sugar-free.    Vitals:    08/12/20 1727   BP: 136/67   Pulse: 87   Resp: 18   Temp: 98.7  F (37.1  C)   SpO2: 96%   Weight: 153 lb (69.4 kg)   Height: 5' 7.5\" (1.715 m)   Questionable weight, as she has also been documented as weighing 20 pounds more and about 20 pounds less.  Pulse is generally in the 80s to 90s, occasionally more or less.  Body mass index is 23.61 kg/m .    EXAMINATION:   General: Fairly pleasant, alert, and conversant elderly female, looking much younger than her stated age, lying in bed, looking very uncomfortable.  Head: Normocephalic and atraumatic.   Eyes: PERRLA, sclerae clear.   ENT: Moist oral mucosa.   Cardiovascular: Previously: Regular rate and rhythm with no appreciable murmur.  Chest not auscultated today due to ongoing COVID-19 precautions.  Respiratory: Previously: Loose, phlegmy cough has returned.  Lungs are still clear to auscultation bilaterally.  Chest not auscultated today due to ongoing COVID-19 precautions.  Abdomen: Soft and nontender.   Musculoskeletal/Extremities: Age-related degenerative joint disease.  Left trace pedal edema.  Integument: No rashes, clinically significant lesions, " or skin breakdown.   Cognitive/Psychiatric: Generally appears alert and oriented, although I question the possibility of some cognitive deficits, and she has been seen by SLP.  When she is not painful, affect is euthymic.    DIAGNOSTICS:     Results for orders placed or performed in visit on 08/05/20   Comprehensive Metabolic Panel   Result Value Ref Range    Sodium 142 136 - 145 mmol/L    Potassium 4.1 3.5 - 5.0 mmol/L    Chloride 107 98 - 107 mmol/L    CO2 25 22 - 31 mmol/L    Anion Gap, Calculation 10 5 - 18 mmol/L    Glucose 94 70 - 125 mg/dL    BUN 8 8 - 28 mg/dL    Creatinine 0.55 (L) 0.60 - 1.10 mg/dL    GFR MDRD Af Amer >60 >60 mL/min/1.73m2    GFR MDRD Non Af Amer >60 >60 mL/min/1.73m2    Bilirubin, Total 0.3 0.0 - 1.0 mg/dL    Calcium 8.2 (L) 8.5 - 10.5 mg/dL    Protein, Total 4.8 (L) 6.0 - 8.0 g/dL    Albumin 2.2 (L) 3.5 - 5.0 g/dL    Alkaline Phosphatase 109 45 - 120 U/L    AST 17 0 - 40 U/L    ALT 15 0 - 45 U/L     Lab Results   Component Value Date    WBC 10.8 07/27/2020    HGB 9.0 (L) 07/27/2020    HCT 28.1 (L) 07/27/2020    MCV 85 07/27/2020     (H) 07/28/2020     Estimated Creatinine Clearance: 90.9 mL/min (A) (by C-G formula based on SCr of 0.55 mg/dL (L)).  Lab Results   Component Value Date    HGBA1C 7.3 (H) 05/10/2020       ASSESSMENT/Plan:      ICD-10-CM    1. Failure to thrive (0-17)  R62.51    2. Pain in both lower extremities  M79.604     M79.605    3. Neuropathy  G62.9    4. History of RLL pneumonia  Z87.01    5. Protein-calorie malnutrition, moderate (H)  E44.0    6. Short-term memory loss  R41.3    7. Hypokalemia  E87.6    8. Essential hypertension, benign  I10      CHANGES:    Increase gabapentin from 300 mg 3 times daily to 400 mg 3 times daily for 3 days, then increase the dose to 500 mg 3 times daily for 3 days, then increase to 600 mg 3 times daily.    CARE PLAN:    The care plan has been reviewed and all orders signed. Changes to care plan, if any, as noted. Otherwise,  continue current plan of care.      The above has been created using voice recognition software. Please be aware that this may unintentionally  produce inaccuracies and/or nonsensical sentences.      Electronically signed by: Trevor Reeder CNP

## 2021-07-20 NOTE — LETTER
Letter by Trevor Reeder CNP at      Author: Trevor Reeder CNP Service: -- Author Type: --    Filed:  Encounter Date: 2020 Status: (Other)         Patient: Veronica Dia   MR Number: [id number]   YOB: 1940   Date of Visit: 2020     [facility]    Name:   Veronica Dia  : 1940  Facility:   [facility] [[id number]]   Room:   Code Status: FULL CODE -   Fac type:   SNF (Skilled Nursing Facility, TCU) -     PCP:  Juve Lundberg MD    CHIEF COMPLAINT / REASON FOR VISIT:  Chief Complaint   Patient presents with   ? Follow-up     TCU follow-up after hospitalization for leg pain, failure to thrive, hypokalemia, hypomagnesemia, hypophosphatemia, and right lower lobe pneumonia.      [facility] from 2020 until 2020 (failure to thrive/severe malnutrition, community-acquired pneumonia, hypokalemia, hypomagnesemia, and hypophosphatemia)    Patient was last seen by me on 2020.      HPI: Veronica is a 80 y.o. female who presented to the ED on 2020 with leg pain and failure to thrive, who was found to have hypokalemia (2.4), hypomagnesemia, and hypophosphatemia.  Electrolytes were replaced.  Physical and occupational therapies and care management were consulted.  She was also treated for community-acquired pneumonia with azithromycin and ceftriaxone, then transitioned to Augmentin and azithromycin (ending on 2020).  A head CT revealed chronic microvascular changes and volume loss but did not reveal any acute intracranial process.  The patient's leg pain was able to be adequately managed with oral and topical medications, and she was discharged to the TCU on 2020.     She had been on chlorthalidone 5 mg daily prior to admission.  They felt this likely contributed to her hypokalemia and was discontinued.  Blood pressures were adequately controlled without medications inpatient.      TCU  "ISSUES    Disposition: Her mantra has been that her legs \"hurt pretty bad,\" that the legs are just \"killing me,\" and that she cannot stand on her feet.  She repeats that her \"toes just don't move.\"  I was informed by nursing and the patient that the pain, located right above the knee on both sides, is increased when sitting up, and they had to put her back to bed.  Patient is really unable to describe the pain other than telling me that \"it just hurts,\" no matter how many different ways I asked.  The result has been just more frustration.  The right side is a bit worse than the left.  X-rays obtained in the hospital show a small loose foreign body in the right knee.  Today, she tells me that her legs are \"kind of sore but not as sore as when I came here.\"    I earlier noted that she is on thiamine, although I did not see a diagnosis of alcoholism/alcohol abuse.  I believe this is a treatment for something other than this; however, I am still wondering about possible Warnicke's.  She tells me she drank \"years ago? Maybe 10 years.\"  She liked [employer] and drank most days after work.  She also says she used to be a smoker but stopped when she started coughing \"maybe 2, 3 months ago.\"    Interestingly, the hospital discharge summary notes that prior to admission she was receiving 500 mg of metformin daily for diabetes mellitus type 2; however, the patient told me, \"I didn't even know I was [a diabetic].  I'm not a sweet eater.\"  She seemed unaware that she was receiving metformin.  We did order speech therapy to evaluate and treat for cognition.  I am told she does poorly on short-term recall (words after 5 minutes).    A CT of the head to evaluate for numbness and motor weakness did reveal chronic microvascular changes with mild generalized volume loss with preferential atrophy of the anterior and medial temporal lobes, but no acute intracranial process.    Rather than go with a narcotic analgesic, we initiated " cyclobenzaprine 10 mg 3 times daily as needed.  However, she has been refusing this, and my assumption is that it is ineffective.  Otherwise, she is only receiving 500 to 1000 mg of acetaminophen every 4 hours as needed.  We added gabapentin 100 mg 3 times daily and also obtained a CMP.  Of particular note is a total protein of 4.8, albumin 2.2, indicating moderate protein-calorie malnutrition.  She may be getting some benefit from the gabapentin, and we are increasing the dose to 200 mg 3 times daily.    Nursing staff report a left heel pressure area (not open), but she is already wearing bilateral Rooke boots.  She does indicate that the bottoms of her feet feel numb.    Therapy reports bilateral muscle wasting and that she cannot dorsiflex the right foot.  She has been bed ridden for at least the last 2 to 3 months, and it is torture for her and apparently previously active person.  She is hardly eating and still complaining of leg pain when up, okay when lying down.    When initially seen, she had a loose phlegmy cough.  Today, it seems to have returned.      ROS: No headaches, chest pains, dizziness or dyspnea, dysuria, constipation or diarrhea, difficulty chewing or swallowing.    Past Medical History:   Diagnosis Date   ? Hypertension    ? Protein-calorie malnutrition, moderate (H) 8/9/2020              Family History   Problem Relation Age of Onset   ? Acute Myocardial Infarction Neg Hx      Social History     Socioeconomic History   ? Marital status:      Spouse name: Not on file   ? Number of children: Not on file   ? Years of education: Not on file   ? Highest education level: Not on file   Occupational History   ? Not on file   Social Needs   ? Financial resource strain: Not on file   ? Food insecurity     Worry: Not on file     Inability: Not on file   ? Transportation needs     Medical: Not on file     Non-medical: Not on file   Tobacco Use   ? Smoking status: Current Some Day Smoker      Packs/day: 2.86     Years: 61.00     Pack years: 174.46   ? Smokeless tobacco: Never Used   Substance and Sexual Activity   ? Alcohol use: Never     Frequency: Never   ? Drug use: Never   ? Sexual activity: Not on file   Lifestyle   ? Physical activity     Days per week: Not on file     Minutes per session: Not on file   ? Stress: Not on file   Relationships   ? Social connections     Talks on phone: Not on file     Gets together: Not on file     Attends [employer] service: Not on file     Active member of club or organization: Not on file     Attends meetings of clubs or organizations: Not on file     Relationship status: Not on file   ? Intimate partner violence     Fear of current or ex partner: Not on file     Emotionally abused: Not on file     Physically abused: Not on file     Forced sexual activity: Not on file   Other Topics Concern   ? Not on file   Social History Narrative   ? Not on file       MEDICATIONS: Reviewed from the MAR, physician orders, and/or earlier progress notes.  Post Discharge Medication Reconciliation Status: medication reconciliation previously completed during another office visit.  Updated by me today (08/05/2020) with the addition of gabapentin reflected below.  Current Outpatient Medications   Medication Sig   ? acetaminophen (TYLENOL) 650 MG CR tablet Take 650 mg by mouth every morning.   ? atorvastatin (LIPITOR) 10 MG tablet Take 1 tablet (10 mg total) by mouth at bedtime.   ? cyclobenzaprine (FLEXERIL) 10 MG tablet Take 10 mg by mouth 3 (three) times a day as needed for muscle spasms.   ? gabapentin (NEURONTIN) 100 MG capsule Take 200 mg by mouth 3 (three) times a day.    ? megestroL (MEGACE) 400 mg/10 mL (40 mg/mL) Take 800 mg by mouth daily.   ? metFORMIN (GLUCOPHAGE-XR) 500 MG 24 hr tablet Take 500 mg by mouth daily.   ? multivitamin with minerals (THERA-M) 9 mg iron-400 mcg Tab tablet Take 1 tablet by mouth daily.   ? polyethylene glycol (MIRALAX) 17 gram packet Take 1 packet  "(17 g total) by mouth daily.   ? thiamine 100 MG tablet Take 1 tablet (100 mg total) by mouth daily.     ALLERGIES: No Known Allergies    DIET: Regular, regular texture, thin liquids.  Mighty shake sugar-free.    Vitals:    08/05/20 1701   BP: 126/64   Pulse: 94   Resp: 17   Temp: 98.3  F (36.8  C)   SpO2: 97%   Weight: 153 lb (69.4 kg)   Height: 5' 7.5\" (1.715 m)   Questionable weight, as she has also been documented as weighing 20 pounds more.  Pulse is generally in the 80s to 90s, occasionally more or less.  Body mass index is 23.61 kg/m .    EXAMINATION:   General: Fairly pleasant, alert, and conversant elderly female, looking much younger than her stated age, up in a wheelchair, looking very uncomfortable.  Head: Normocephalic and atraumatic.   Eyes: PERRLA, sclerae clear.   ENT: Moist oral mucosa.   Cardiovascular: Previously: Regular rate and rhythm with no appreciable murmur.   Respiratory: Loose, phlegmy cough has returned.  Lungs are still clear to auscultation bilaterally.   Abdomen: Soft and nontender.   Musculoskeletal/Extremities: Age-related degenerative joint disease.  Left trace pedal edema.  Integument: No rashes, clinically significant lesions, or skin breakdown.   Cognitive/Psychiatric: Generally appears alert and oriented, although I question the possibility of some cognitive deficits, and she has been seen by SLP.  When she is not painful, affect is euthymic.    DIAGNOSTICS:     Results for orders placed or performed in visit on 08/05/20   Comprehensive Metabolic Panel   Result Value Ref Range    Sodium 142 136 - 145 mmol/L    Potassium 4.1 3.5 - 5.0 mmol/L    Chloride 107 98 - 107 mmol/L    CO2 25 22 - 31 mmol/L    Anion Gap, Calculation 10 5 - 18 mmol/L    Glucose 94 70 - 125 mg/dL    BUN 8 8 - 28 mg/dL    Creatinine 0.55 (L) 0.60 - 1.10 mg/dL    GFR MDRD Af Amer >60 >60 mL/min/1.73m2    GFR MDRD Non Af Amer >60 >60 mL/min/1.73m2    Bilirubin, Total 0.3 0.0 - 1.0 mg/dL    Calcium 8.2 (L) 8.5 " - 10.5 mg/dL    Protein, Total 4.8 (L) 6.0 - 8.0 g/dL    Albumin 2.2 (L) 3.5 - 5.0 g/dL    Alkaline Phosphatase 109 45 - 120 U/L    AST 17 0 - 40 U/L    ALT 15 0 - 45 U/L     Lab Results   Component Value Date    WBC 10.8 07/27/2020    HGB 9.0 (L) 07/27/2020    HCT 28.1 (L) 07/27/2020    MCV 85 07/27/2020     (H) 07/28/2020     Estimated Creatinine Clearance: 90.9 mL/min (A) (by C-G formula based on SCr of 0.55 mg/dL (L)).  Lab Results   Component Value Date    HGBA1C 7.3 (H) 05/10/2020       ASSESSMENT/Plan:      ICD-10-CM    1. Failure to thrive (0-17)  R62.51    2. Pain in both lower extremities  M79.604     M79.605    3. Pneumonia of right lower lobe due to infectious organism  J18.9    4. Protein-calorie malnutrition, moderate (H)  E44.0    5. Short-term memory loss  R41.3    6. Hypokalemia  E87.6    7. Essential hypertension, benign  I10      CHANGES:    1.  Increase gabapentin from 100 mg 3 times daily to 200 mg 3 times daily.  After several days, we will increase the dose to 300 mg 3 times daily.    CARE PLAN:    The care plan has been reviewed and all orders signed. Changes to care plan, if any, as noted. Otherwise, continue current plan of care.  Total time spent this patient was approximately 35 minutes, with greater than 50% spent in counseling and coordination of care that included once again reviewing issues of pain with nursing staff and inability to progress with physical therapy.      The above has been created using voice recognition software. Please be aware that this may unintentionally  produce inaccuracies and/or nonsensical sentences.      Electronically signed by: Trevor Reeder CNP

## 2021-07-20 NOTE — PROGRESS NOTES
"[facility]    Name:   Veronica Dia  : 1940  Facility:   [facility] [[id number]]   Room:   Code Status: FULL CODE -   Fac type:   SNF (Skilled Nursing Facility, TCU) -     PCP:  Juve Lundberg MD    CHIEF COMPLAINT / REASON FOR VISIT:  Chief Complaint   Patient presents with     Follow-up     TCU follow-up after hospitalization for leg pain, failure to thrive, hypokalemia, hypomagnesemia, hypophosphatemia, and right lower lobe pneumonia.      [facility] from 2020 until 2020 (failure to thrive/severe malnutrition, community-acquired pneumonia, hypokalemia, hypomagnesemia, and hypophosphatemia)    Patient was last seen by me on 2020.      HPI: Veronica is a 80 y.o. female who presented to the ED on 2020 with leg pain and failure to thrive, who was found to have hypokalemia (2.4), hypomagnesemia, and hypophosphatemia.  Electrolytes were replaced.  Physical and occupational therapies and care management were consulted.  She was also treated for community-acquired pneumonia with azithromycin and ceftriaxone, then transitioned to Augmentin and azithromycin (ending on 2020).  A head CT revealed chronic microvascular changes and volume loss but did not reveal any acute intracranial process.  The patient's leg pain was able to be adequately managed with oral and topical medications, and she was discharged to the TCU on 2020.     She had been on chlorthalidone 5 mg daily prior to admission.  They felt this likely contributed to her hypokalemia and was discontinued.  Blood pressures were adequately controlled without medications inpatient.      TCU ISSUES    Disposition: Her mantra has been that her legs \"hurt pretty bad,\" that the legs are just \"killing me,\" and that she cannot stand on her feet.  She repeats that her \"toes just don't move.\"  I was informed by nursing and the patient that the pain, located right above the knee " "on both sides, is increased when sitting up, and they had to put her back to bed.  Patient is really unable to describe the pain other than telling me that \"it just hurts,\" no matter how many different ways I asked.  The result has been just more frustration.  The right side is a bit worse than the left.  X-rays obtained in the hospital show a small loose foreign body in the right knee.  Today, she tells me that her legs are \"kind of sore but not as sore as when I came here.\"    I earlier noted that she is on thiamine, although I did not see a diagnosis of alcoholism/alcohol abuse.  I believe this is a treatment for something other than this; however, I am still wondering about possible Warnicke's.  She tells me she drank \"years ago  Maybe 10 years.\"  She liked [employer] and drank most days after work.  She also says she used to be a smoker but stopped when she started coughing \"maybe 2, 3 months ago.\"    Interestingly, the hospital discharge summary notes that prior to admission she was receiving 500 mg of metformin daily for diabetes mellitus type 2; however, the patient told me, \"I didn't even know I was [a diabetic].  I'm not a sweet eater.\"  She seemed unaware that she was receiving metformin.  We did order speech therapy to evaluate and treat for cognition.  I am told she does poorly on short-term recall (words after 5 minutes).    A CT of the head to evaluate for numbness and motor weakness did reveal chronic microvascular changes with mild generalized volume loss with preferential atrophy of the anterior and medial temporal lobes, but no acute intracranial process.    Rather than go with a narcotic analgesic, we initiated cyclobenzaprine 10 mg 3 times daily as needed.  However, she has been refusing this, and my assumption is that it is ineffective.  Otherwise, she is only receiving 500 to 1000 mg of acetaminophen every 4 hours as needed.  We added gabapentin 100 mg 3 times daily and also obtained a " CMP.  Of particular note is a total protein of 4.8, albumin 2.2, indicating moderate protein-calorie malnutrition.  She may be getting some benefit from the gabapentin, and we are increasing the dose to 200 mg 3 times daily.    Nursing staff report a left heel pressure area (not open), but she is already wearing bilateral Rooke boots.  She does indicate that the bottoms of her feet feel numb.    Therapy reports bilateral muscle wasting and that she cannot dorsiflex the right foot.  She has been bed ridden for at least the last 2 to 3 months, and it is torture for her and apparently previously active person.  She is hardly eating and still complaining of leg pain when up, okay when lying down.    When initially seen, she had a loose phlegmy cough.  Today, it seems to have returned.      ROS: No headaches, chest pains, dizziness or dyspnea, dysuria, constipation or diarrhea, difficulty chewing or swallowing.    Past Medical History:   Diagnosis Date     Hypertension      Protein-calorie malnutrition, moderate (H) 8/9/2020              Family History   Problem Relation Age of Onset     Acute Myocardial Infarction Neg Hx      Social History     Socioeconomic History     Marital status:      Spouse name: Not on file     Number of children: Not on file     Years of education: Not on file     Highest education level: Not on file   Occupational History     Not on file   Social Needs     Financial resource strain: Not on file     Food insecurity     Worry: Not on file     Inability: Not on file     Transportation needs     Medical: Not on file     Non-medical: Not on file   Tobacco Use     Smoking status: Current Some Day Smoker     Packs/day: 2.86     Years: 61.00     Pack years: 174.46     Smokeless tobacco: Never Used   Substance and Sexual Activity     Alcohol use: Never     Frequency: Never     Drug use: Never     Sexual activity: Not on file   Lifestyle     Physical activity     Days per week: Not on file      Minutes per session: Not on file     Stress: Not on file   Relationships     Social connections     Talks on phone: Not on file     Gets together: Not on file     Attends Jain service: Not on file     Active member of club or organization: Not on file     Attends meetings of clubs or organizations: Not on file     Relationship status: Not on file     Intimate partner violence     Fear of current or ex partner: Not on file     Emotionally abused: Not on file     Physically abused: Not on file     Forced sexual activity: Not on file   Other Topics Concern     Not on file   Social History Narrative     Not on file       MEDICATIONS: Reviewed from the MAR, physician orders, and/or earlier progress notes.  Post Discharge Medication Reconciliation Status: medication reconciliation previously completed during another office visit.  Updated by me today (08/05/2020) with the addition of gabapentin reflected below.  Current Outpatient Medications   Medication Sig     acetaminophen (TYLENOL) 650 MG CR tablet Take 650 mg by mouth every morning.     atorvastatin (LIPITOR) 10 MG tablet Take 1 tablet (10 mg total) by mouth at bedtime.     cyclobenzaprine (FLEXERIL) 10 MG tablet Take 10 mg by mouth 3 (three) times a day as needed for muscle spasms.     gabapentin (NEURONTIN) 100 MG capsule Take 200 mg by mouth 3 (three) times a day.      megestroL (MEGACE) 400 mg/10 mL (40 mg/mL) Take 800 mg by mouth daily.     metFORMIN (GLUCOPHAGE-XR) 500 MG 24 hr tablet Take 500 mg by mouth daily.     multivitamin with minerals (THERA-M) 9 mg iron-400 mcg Tab tablet Take 1 tablet by mouth daily.     polyethylene glycol (MIRALAX) 17 gram packet Take 1 packet (17 g total) by mouth daily.     thiamine 100 MG tablet Take 1 tablet (100 mg total) by mouth daily.     ALLERGIES: No Known Allergies    DIET: Regular, regular texture, thin liquids.  Mighty shake sugar-free.    Vitals:    08/05/20 1701   BP: 126/64   Pulse: 94   Resp: 17   Temp: 98.3  " F (36.8  C)   SpO2: 97%   Weight: 153 lb (69.4 kg)   Height: 5' 7.5\" (1.715 m)   Questionable weight, as she has also been documented as weighing 20 pounds more.  Pulse is generally in the 80s to 90s, occasionally more or less.  Body mass index is 23.61 kg/m .    EXAMINATION:   General: Fairly pleasant, alert, and conversant elderly female, looking much younger than her stated age, up in a wheelchair, looking very uncomfortable.  Head: Normocephalic and atraumatic.   Eyes: PERRLA, sclerae clear.   ENT: Moist oral mucosa.   Cardiovascular: Previously: Regular rate and rhythm with no appreciable murmur.   Respiratory: Loose, phlegmy cough has returned.  Lungs are still clear to auscultation bilaterally.   Abdomen: Soft and nontender.   Musculoskeletal/Extremities: Age-related degenerative joint disease.  Left trace pedal edema.  Integument: No rashes, clinically significant lesions, or skin breakdown.   Cognitive/Psychiatric: Generally appears alert and oriented, although I question the possibility of some cognitive deficits, and she has been seen by SLP.  When she is not painful, affect is euthymic.    DIAGNOSTICS:     Results for orders placed or performed in visit on 08/05/20   Comprehensive Metabolic Panel   Result Value Ref Range    Sodium 142 136 - 145 mmol/L    Potassium 4.1 3.5 - 5.0 mmol/L    Chloride 107 98 - 107 mmol/L    CO2 25 22 - 31 mmol/L    Anion Gap, Calculation 10 5 - 18 mmol/L    Glucose 94 70 - 125 mg/dL    BUN 8 8 - 28 mg/dL    Creatinine 0.55 (L) 0.60 - 1.10 mg/dL    GFR MDRD Af Amer >60 >60 mL/min/1.73m2    GFR MDRD Non Af Amer >60 >60 mL/min/1.73m2    Bilirubin, Total 0.3 0.0 - 1.0 mg/dL    Calcium 8.2 (L) 8.5 - 10.5 mg/dL    Protein, Total 4.8 (L) 6.0 - 8.0 g/dL    Albumin 2.2 (L) 3.5 - 5.0 g/dL    Alkaline Phosphatase 109 45 - 120 U/L    AST 17 0 - 40 U/L    ALT 15 0 - 45 U/L     Lab Results   Component Value Date    WBC 10.8 07/27/2020    HGB 9.0 (L) 07/27/2020    HCT 28.1 (L) 07/27/2020 "    MCV 85 07/27/2020     (H) 07/28/2020     Estimated Creatinine Clearance: 90.9 mL/min (A) (by C-G formula based on SCr of 0.55 mg/dL (L)).  Lab Results   Component Value Date    HGBA1C 7.3 (H) 05/10/2020       ASSESSMENT/Plan:      ICD-10-CM    1. Failure to thrive (0-17)  R62.51    2. Pain in both lower extremities  M79.604     M79.605    3. Pneumonia of right lower lobe due to infectious organism  J18.9    4. Protein-calorie malnutrition, moderate (H)  E44.0    5. Short-term memory loss  R41.3    6. Hypokalemia  E87.6    7. Essential hypertension, benign  I10      CHANGES:    1.  Increase gabapentin from 100 mg 3 times daily to 200 mg 3 times daily.  After several days, we will increase the dose to 300 mg 3 times daily.    CARE PLAN:    The care plan has been reviewed and all orders signed. Changes to care plan, if any, as noted. Otherwise, continue current plan of care.  Total time spent this patient was approximately 35 minutes, with greater than 50% spent in counseling and coordination of care that included once again reviewing issues of pain with nursing staff and inability to progress with physical therapy.      The above has been created using voice recognition software. Please be aware that this may unintentionally  produce inaccuracies and/or nonsensical sentences.      Electronically signed by: Trevor Reeder CNP

## 2021-07-20 NOTE — PROGRESS NOTES
"HOME HEALTH MISSED VISIT NOTIFICATION (FYI)     Your patient who receives home health services missed a visit on: 7/1/2020    Type of service missed: skilled nursing eval    Reason for missed visit (pick applicable reason):          Patient/Family refused (explain): daughter previous evening accepted SN eval for pt for 7/1/20 but AM 7/1/20 patient vehemently refused SNV : \"I won't have anyone looking at my butt!\" and refused all nurse v  isits.           Provider(s) to be notified: Dr. Lundberg    This request is sent as an inbasket message to , and provider if in our system.     "

## 2021-07-20 NOTE — LETTER
Letter by Trevor Reeder CNP at      Author: Trevor Reeder CNP Service: -- Author Type: --    Filed:  Encounter Date: 2020 Status: (Other)         Patient: Veronica Dia   MR Number: [id number]   YOB: 1940   Date of Visit: 2020     [facility]    Name:   Veronica Dia  : 1940  Facility:   [facility] [[id number]]   Room:   Code Status: FULL CODE -   Fac type:   SNF (Skilled Nursing Facility, TCU) -     PCP:  Juve Lundberg MD    CHIEF COMPLAINT / REASON FOR VISIT:  Chief Complaint   Patient presents with   ? Follow-up     TCU follow-up after hospitalization for leg pain, failure to thrive, hypokalemia, hypomagnesemia, hypophosphatemia, and right lower lobe pneumonia.      [facility] from 2020 until 2020 (failure to thrive/severe malnutrition, community-acquired pneumonia, hypokalemia, hypomagnesemia, and hypophosphatemia)      HPI: Veronica is a 80 y.o. female who presented to the ED on 2020 with leg pain and failure to thrive, who was found to have hypokalemia (2.4), hypomagnesemia, and hypophosphatemia.  Electrolytes were replaced.  Physical and occupational therapies and care management were consulted.  She was also treated for community-acquired pneumonia with azithromycin and ceftriaxone, then transitioned to Augmentin and azithromycin (ending on 2020).  A head CT revealed chronic microvascular changes and volume loss but did not reveal any acute intracranial process.  The patient's leg pain was able to be adequately managed with oral and topical medications, and she was discharged to the TCU on 2020.     She had been on chlorthalidone 5 mg daily prior to admission.  They felt this likely contributed to her hypokalemia and was discontinued.  Blood pressures were adequately controlled without medications inpatient.      TCU ISSUES    Disposition: She tells me that her legs  "\"hurt pretty bad (10/10),\" and she claims that she cannot stand on her feet.  She tells me that her \"toes just don't move.\"  I am informed by nursing that the pain, located right above the knee on both sides, is increased when sitting up, and they had to put her back to bed.  Patient is really unable to describe the pain other than telling me that it hurts.  The right side is a bit worse.  X-rays obtained in the hospital show a small loose foreign body in the right knee.     I note that she is on thiamine, although I do not see a diagnosis of alcoholism/alcohol abuse.  Wondering about Warnicke's.  Interestingly, the hospital discharge summary notes that prior to admission she was receiving 500 mg of metformin daily for diabetes mellitus type 2; however, the patient told me, \"I didn't even know I was [a diabetic].  I'm not a sweet eater.\"  She seemed unaware that she was receiving metformin.  We will order speech therapy to evaluate and treat for cognition.  A CT of the head to evaluate for numbness and motor weakness did reveal chronic microvascular changes with mild generalized volume loss with preferential atrophy of the anterior and medial temporal lobes, but no acute intracranial process.    Rather than go with a narcotic analgesic, we will initiate cyclobenzaprine 10 mg 3 times daily as needed.  Currently, she is only receiving 500 to 1000 mg of acetaminophen every 4 hours as needed.    Nursing staff report a left heel pressure area (not open), but she is already wearing bilateral Rooke boots.  I had checked the right heel and noted no abnormalities there.    She does have a loose, phlegmy cough, but her lung sounds are otherwise clear.    Labs: Follow-up from the hospital planned for 07/31/2020.      ROS: No headaches, chest pains, dizziness or dyspnea, dysuria, constipation or diarrhea, difficulty chewing or swallowing.    Past Medical History:   Diagnosis Date   ? Hypertension               Family History "   Problem Relation Age of Onset   ? Acute Myocardial Infarction Neg Hx      Social History     Socioeconomic History   ? Marital status:      Spouse name: Not on file   ? Number of children: Not on file   ? Years of education: Not on file   ? Highest education level: Not on file   Occupational History   ? Not on file   Social Needs   ? Financial resource strain: Not on file   ? Food insecurity     Worry: Not on file     Inability: Not on file   ? Transportation needs     Medical: Not on file     Non-medical: Not on file   Tobacco Use   ? Smoking status: Current Some Day Smoker     Packs/day: 2.86     Years: 61.00     Pack years: 174.46   ? Smokeless tobacco: Never Used   Substance and Sexual Activity   ? Alcohol use: Never     Frequency: Never   ? Drug use: Never   ? Sexual activity: Not on file   Lifestyle   ? Physical activity     Days per week: Not on file     Minutes per session: Not on file   ? Stress: Not on file   Relationships   ? Social connections     Talks on phone: Not on file     Gets together: Not on file     Attends Christian service: Not on file     Active member of club or organization: Not on file     Attends meetings of clubs or organizations: Not on file     Relationship status: Not on file   ? Intimate partner violence     Fear of current or ex partner: Not on file     Emotionally abused: Not on file     Physically abused: Not on file     Forced sexual activity: Not on file   Other Topics Concern   ? Not on file   Social History Narrative   ? Not on file       MEDICATIONS: Reviewed from the MAR, physician orders, and/or earlier progress notes.  Post Discharge Medication Reconciliation Status: discharge medications reconciled and changed, per note/orders.  Updated by me today (07/29/2020) with the addition of cyclobenzaprine reflected below.  Current Outpatient Medications   Medication Sig   ? cyclobenzaprine (FLEXERIL) 10 MG tablet Take 10 mg by mouth 3 (three) times a day as needed for  "muscle spasms.   ? acetaminophen (TYLENOL) 650 MG CR tablet Take 650 mg by mouth every morning.   ? amoxicillin-clavulanate (AUGMENTIN) 875-125 mg per tablet Take 1 tablet by mouth 2 (two) times a day for 5 days.   ? atorvastatin (LIPITOR) 10 MG tablet Take 1 tablet (10 mg total) by mouth at bedtime.   ? azithromycin (ZITHROMAX) 250 MG tablet Take 500 mg (2 x 250 mg tablets) on day 1 followed by 250 mg (1 tablet) on days 2-5.   ? metFORMIN (GLUCOPHAGE-XR) 500 MG 24 hr tablet Take 500 mg by mouth daily.   ? multivitamin with minerals (THERA-M) 9 mg iron-400 mcg Tab tablet Take 1 tablet by mouth daily.   ? polyethylene glycol (MIRALAX) 17 gram packet Take 1 packet (17 g total) by mouth daily.   ? thiamine 100 MG tablet Take 1 tablet (100 mg total) by mouth daily.     ALLERGIES: No Known Allergies    DIET: Regular, regular texture, thin liquids.  Mighty shake sugar-free.    Vitals:    07/29/20 1341   BP: 93/74   Pulse: 74   Resp: 20   Temp: 98.7  F (37.1  C)   SpO2: 94%   Weight: 137 lb 9.6 oz (62.4 kg)   Height: 5' 7.5\" (1.715 m)     Body mass index is 21.23 kg/m .    EXAMINATION:   General: Fairly pleasant, alert, and conversant elderly female, lying in bed, in no apparent distress.  Head: Normocephalic and atraumatic.   Eyes: PERRLA, sclerae clear.   ENT: Moist oral mucosa.   Cardiovascular: Regular rate and rhythm with no appreciable murmur.   Respiratory: Loose, phlegmy bronchial sounds.  Otherwise, lungs clear to auscultation bilaterally.   Abdomen: Soft and nontender.   Musculoskeletal/Extremities: Age-related degenerative joint disease.  Bilateral trace to 1+ pedal edema.  Integument: No rashes, clinically significant lesions, or skin breakdown.   Cognitive/Psychiatric: Generally appears alert and oriented, although I question the possibility of some cognitive deficits, and we are requesting SLP to eval and treat.    DIAGNOSTICS:   Results for orders placed or performed during the hospital encounter of 07/23/20 "   Basic Metabolic Panel   Result Value Ref Range    Sodium 138 136 - 145 mmol/L    Potassium 4.1 3.5 - 5.0 mmol/L    Chloride 109 (H) 98 - 107 mmol/L    CO2 23 22 - 31 mmol/L    Anion Gap, Calculation 6 5 - 18 mmol/L    Glucose 132 (H) 70 - 125 mg/dL    Calcium 7.6 (L) 8.5 - 10.5 mg/dL    BUN 3 (L) 8 - 28 mg/dL    Creatinine 0.47 (L) 0.60 - 1.10 mg/dL    GFR MDRD Af Amer >60 >60 mL/min/1.73m2    GFR MDRD Non Af Amer >60 >60 mL/min/1.73m2         Lab Results   Component Value Date    WBC 10.8 07/27/2020    HGB 9.0 (L) 07/27/2020    HCT 28.1 (L) 07/27/2020    MCV 85 07/27/2020     (H) 07/28/2020     Estimated Creatinine Clearance: 95.6 mL/min (A) (by C-G formula based on SCr of 0.47 mg/dL (L)).  Lab Results   Component Value Date    HGBA1C 7.3 (H) 05/10/2020       ASSESSMENT/Plan:      ICD-10-CM    1. Failure to thrive (0-17)  R62.51    2. Pain in both lower extremities  M79.604     M79.605    3. Pneumonia of right lower lobe due to infectious organism  J18.9    4. Hypokalemia  E87.6    5. Essential hypertension, benign  I10      CHANGES:    Add cyclobenzaprine 10 mg 3 times daily as needed for leg pain.    CARE PLAN:    The care plan has been reviewed and all orders signed. Changes to care plan, if any, as noted. Otherwise, continue current plan of care.  Total time spent this patient was approximately 35 minutes, with greater than 50% spent in counseling and coordination of care that involved reviewing issues of pain with nursing staff.  These issues will be followed closely.  Orders were written for medication to hopefully provide some relief.    The above has been created using voice recognition software. Please be aware that this may unintentionally  produce inaccuracies and/or nonsensical sentences.      Electronically signed by: Trevor Reeder CNP

## 2021-07-20 NOTE — LETTER
Letter by Trevor Reeder CNP at      Author: Trevor Reeder CNP Service: -- Author Type: --    Filed:  Encounter Date: 8/10/2020 Status: (Other)         Patient: Veronica Dia   MR Number: [id number]   YOB: 1940   Date of Visit: 8/10/2020     [facility]    Name:   Veronica Dia  : 1940  Facility:   [facility] [[id number]]   Room:   Code Status: FULL CODE -   Fac type:   SNF (Skilled Nursing Facility, TCU) -     PCP:  Juve Lundberg MD    CHIEF COMPLAINT / REASON FOR VISIT:  Chief Complaint   Patient presents with   ? Follow-up     TCU follow-up after hospitalization for leg pain, failure to thrive, hypokalemia, hypomagnesemia, hypophosphatemia, and right lower lobe pneumonia.       from 2020 until 2020 (failure to thrive/severe malnutrition, community-acquired pneumonia, hypokalemia, hypomagnesemia, and hypophosphatemia)    Patient was last seen by me on 2020.      HPI: Veronica is a 80 y.o. female who presented to the ED on 2020 with leg pain and failure to thrive, who was found to have hypokalemia (2.4), hypomagnesemia, and hypophosphatemia.  Electrolytes were replaced.  Physical and occupational therapies and care management were consulted.  She was also treated for community-acquired pneumonia with azithromycin and ceftriaxone, then transitioned to Augmentin and azithromycin (ending on 2020).  A head CT revealed chronic microvascular changes and volume loss but did not reveal any acute intracranial process.  The patient's leg pain was able to be adequately managed with oral and topical medications, and she was discharged to the TCU on 2020.     She had been on chlorthalidone 5 mg daily prior to admission.  They felt this likely contributed to her hypokalemia and was discontinued.  Blood pressures were adequately controlled without medications inpatient.      TCU  "ISSUES    Disposition: Her mantra has been that her legs \"hurt pretty bad,\" that the legs are just \"killing me,\" and that she cannot stand on her feet, and her \"toes just don't move.\"  Pain, apparently located right above the knees bilaterally, is increased when sitting upright.  She is really unable to describe the pain other than telling me that \"it just hurts,\" no matter how many different ways she is asked.  The result has been just more frustration.  The right side may or may not be a bit worse than the left.  X-rays obtained in the hospital show a small loose foreign body in the right knee.  Without really any changes, and my last visit she told me that her legs are \"kind of sore but not as sore as when I came here.\"    I earlier noted that she is on thiamine, although I did not see a diagnosis of alcoholism/alcohol abuse.  I believe this is a treatment for something other than this; however, I am still wondering about possible Warnicke's.  She tells me she drank \"years ago? Maybe 10 years.\"  She liked [employer] and drank most days after work.  She also says she used to be a smoker but stopped when she started coughing \"maybe 2, 3 months ago.\"    Interestingly, the hospital discharge summary notes that prior to admission she was receiving 500 mg of metformin daily for diabetes mellitus type 2; however, the patient told me, \"I didn't even know I was [a diabetic].  I'm not a sweet eater.\"  She seemed unaware that she was receiving metformin.  We did order speech therapy to evaluate and treat for cognition.  I am told she does poorly on short-term recall (words after 5 minutes).    A CT of the head to evaluate for numbness and motor weakness did reveal chronic microvascular changes with mild generalized volume loss with preferential atrophy of the anterior and medial temporal lobes, but no acute intracranial process.    Rather than go with a narcotic analgesic, we initiated cyclobenzaprine 10 mg 3 times " daily as needed.  However, she has been refusing this, and my assumption is that it is ineffective.  Otherwise, she is only receiving 500 to 1000 mg of acetaminophen every 4 hours as needed.  We added gabapentin 100 mg 3 times daily and also obtained a CMP.  Of particular note is a total protein of 4.8, albumin 2.2, indicating moderate protein-calorie malnutrition.  At my last visit, we increased her gabapentin from 100 mg 3 times daily to 200 mg 3 times daily.  Today, we will increase the dose to 300 mg 3 times daily.    Nursing staff report a left heel pressure area (not open), but she is already wearing bilateral [place] boots.  She does indicate that the bottoms of her feet feel numb.    Therapy reports bilateral muscle wasting and that she cannot dorsiflex the right foot.  She has been bed ridden for at least the last 2 to 3 months, and it is torture for her and apparently previously active person.  She is hardly eating and still complaining of leg pain when up, okay when lying down.  Considering sending her to the pain clinic or to rheumatology.  She has not been to either. She has, apparently, been a resident in several facilities.      ROS: No headaches, chest pains, dizziness or dyspnea, dysuria, constipation or diarrhea, difficulty chewing or swallowing.    Past Medical History:   Diagnosis Date   ? Hypertension    ? Protein-calorie malnutrition, moderate (H) 8/9/2020              Family History   Problem Relation Age of Onset   ? Acute Myocardial Infarction Neg Hx      Social History     Socioeconomic History   ? Marital status:      Spouse name: Not on file   ? Number of children: Not on file   ? Years of education: Not on file   ? Highest education level: Not on file   Occupational History   ? Not on file   Social Needs   ? Financial resource strain: Not on file   ? Food insecurity     Worry: Not on file     Inability: Not on file   ? Transportation needs     Medical: Not on file     Non-medical:  Not on file   Tobacco Use   ? Smoking status: Current Some Day Smoker     Packs/day: 2.86     Years: 61.00     Pack years: 174.46   ? Smokeless tobacco: Never Used   Substance and Sexual Activity   ? Alcohol use: Never     Frequency: Never   ? Drug use: Never   ? Sexual activity: Not on file   Lifestyle   ? Physical activity     Days per week: Not on file     Minutes per session: Not on file   ? Stress: Not on file   Relationships   ? Social connections     Talks on phone: Not on file     Gets together: Not on file     Attends [employer] service: Not on file     Active member of club or organization: Not on file     Attends meetings of clubs or organizations: Not on file     Relationship status: Not on file   ? Intimate partner violence     Fear of current or ex partner: Not on file     Emotionally abused: Not on file     Physically abused: Not on file     Forced sexual activity: Not on file   Other Topics Concern   ? Not on file   Social History Narrative   ? Not on file       MEDICATIONS: Reviewed from the MAR, physician orders, and/or earlier progress notes.  Post Discharge Medication Reconciliation Status: medication reconciliation previously completed during another office visit.    Updated by me today (08/10/2020) with an increase in gabapentin from 200 mg 3 times daily to 300 mg 3 times daily reflected below.  Current Outpatient Medications   Medication Sig   ? acetaminophen (TYLENOL) 650 MG CR tablet Take 650 mg by mouth every morning.   ? atorvastatin (LIPITOR) 10 MG tablet Take 1 tablet (10 mg total) by mouth at bedtime.   ? cyclobenzaprine (FLEXERIL) 10 MG tablet Take 10 mg by mouth 3 (three) times a day as needed for muscle spasms.   ? gabapentin (NEURONTIN) 100 MG capsule Take 300 mg by mouth 3 (three) times a day.    ? megestroL (MEGACE) 400 mg/10 mL (40 mg/mL) Take 800 mg by mouth daily.   ? metFORMIN (GLUCOPHAGE-XR) 500 MG 24 hr tablet Take 500 mg by mouth daily.   ? multivitamin with minerals  "(THERA-M) 9 mg iron-400 mcg Tab tablet Take 1 tablet by mouth daily.   ? polyethylene glycol (MIRALAX) 17 gram packet Take 1 packet (17 g total) by mouth daily.   ? thiamine 100 MG tablet Take 1 tablet (100 mg total) by mouth daily.     ALLERGIES: No Known Allergies    DIET: Regular, regular texture, thin liquids.  Mighty shake sugar-free.    Vitals:    08/10/20 1811   BP: 122/52   Pulse: 92   Resp: 16   Temp: 98.5  F (36.9  C)   SpO2: 97%   Weight: 153 lb (69.4 kg)   Height: 5' 7.5\" (1.715 m)   Questionable weight, as she has also been documented as weighing 20 pounds more and about 20 pounds less.  Pulse is generally in the 80s to 90s, occasionally more or less.  Body mass index is 23.61 kg/m .    EXAMINATION:   General: Fairly pleasant, alert, and conversant elderly female, looking much younger than her stated age, lying in bed, looking very uncomfortable.  Head: Normocephalic and atraumatic.   Eyes: PERRLA, sclerae clear.   ENT: Moist oral mucosa.   Cardiovascular: Previously: Regular rate and rhythm with no appreciable murmur.   Respiratory: Loose, phlegmy cough has returned.  Lungs are still clear to auscultation bilaterally.   Abdomen: Soft and nontender.   Musculoskeletal/Extremities: Age-related degenerative joint disease.  Left trace pedal edema.  Integument: No rashes, clinically significant lesions, or skin breakdown.   Cognitive/Psychiatric: Generally appears alert and oriented, although I question the possibility of some cognitive deficits, and she has been seen by SLP.  When she is not painful, affect is euthymic.    DIAGNOSTICS:     Results for orders placed or performed in visit on 08/05/20   Comprehensive Metabolic Panel   Result Value Ref Range    Sodium 142 136 - 145 mmol/L    Potassium 4.1 3.5 - 5.0 mmol/L    Chloride 107 98 - 107 mmol/L    CO2 25 22 - 31 mmol/L    Anion Gap, Calculation 10 5 - 18 mmol/L    Glucose 94 70 - 125 mg/dL    BUN 8 8 - 28 mg/dL    Creatinine 0.55 (L) 0.60 - 1.10 mg/dL "    GFR MDRD Af Amer >60 >60 mL/min/1.73m2    GFR MDRD Non Af Amer >60 >60 mL/min/1.73m2    Bilirubin, Total 0.3 0.0 - 1.0 mg/dL    Calcium 8.2 (L) 8.5 - 10.5 mg/dL    Protein, Total 4.8 (L) 6.0 - 8.0 g/dL    Albumin 2.2 (L) 3.5 - 5.0 g/dL    Alkaline Phosphatase 109 45 - 120 U/L    AST 17 0 - 40 U/L    ALT 15 0 - 45 U/L     Lab Results   Component Value Date    WBC 10.8 07/27/2020    HGB 9.0 (L) 07/27/2020    HCT 28.1 (L) 07/27/2020    MCV 85 07/27/2020     (H) 07/28/2020     Estimated Creatinine Clearance: 90.9 mL/min (A) (by C-G formula based on SCr of 0.55 mg/dL (L)).  Lab Results   Component Value Date    HGBA1C 7.3 (H) 05/10/2020       ASSESSMENT/Plan:      ICD-10-CM    1. Failure to thrive (0-17)  R62.51    2. Pain in both lower extremities  M79.604     M79.605    3. Neuropathy  G62.9    4. History of RLL pneumonia  Z87.01    5. Protein-calorie malnutrition, moderate (H)  E44.0    6. Short-term memory loss  R41.3    7. Hypokalemia  E87.6    8. Essential hypertension, benign  I10      CHANGES:    Increase gabapentin from 200 mg 3 times daily to 300 mg 3 times daily.    CARE PLAN:    The care plan has been reviewed and all orders signed. Changes to care plan, if any, as noted. Otherwise, continue current plan of care.      The above has been created using voice recognition software. Please be aware that this may unintentionally  produce inaccuracies and/or nonsensical sentences.      Electronically signed by: Trevor Reeder CNP

## 2021-07-20 NOTE — TELEPHONE ENCOUNTER
Rl (patient's ) calling reports patient won't talk or walk. Said its been 4-5 months now, but she is getting worse and worse. Said again that she can't walk. Caller thinks patient needs emergency care. Advised caller to hang up and call 911. Caller said ok and hung up phone.     Sandy Rios, RN/[facility]    Reason for Disposition    Weakness, severe (i.e., unable to walk or barely able to walk, requires support) AND new onset or worsening    Protocols used: 911 [id number]

## 2021-07-20 NOTE — PROGRESS NOTES
"[facility]    Name:   Veronica Dia  : 1940  Facility:   [facility] [[id number]]   Room: /210  Code Status: FULL CODE -   Fac type:   SNF (Skilled Nursing Facility, TCU) -     PCP:  Juve Lundberg MD    CHIEF COMPLAINT / REASON FOR VISIT:  Chief Complaint   Patient presents with     Discharge Summary     TCU discharge after hospitalization for leg pain, failure to thrive, hypokalemia, hypomagnesemia, hypophosphatemia, and right lower lobe pneumonia.      [facility] from 2020 until 2020 (failure to thrive/severe malnutrition, community-acquired pneumonia, hypokalemia, hypomagnesemia, and hypophosphatemia)    Patient was last seen by me on 2020.      HPI: Veronica is a 80 y.o. female who presented to the ED on 2020 with leg pain and failure to thrive, who was found to have hypokalemia (2.4), hypomagnesemia, and hypophosphatemia.  Electrolytes were replaced.  Physical and occupational therapies and care management were consulted.  She was also treated for community-acquired pneumonia with azithromycin and ceftriaxone, then transitioned to Augmentin and azithromycin (ending on 2020).  A head CT revealed chronic microvascular changes and volume loss but did not reveal any acute intracranial process.  The patient's leg pain was able to be adequately managed with oral and topical medications, and she was discharged to the TCU on 2020.     She had been on chlorthalidone 5 mg daily prior to admission.  They felt this likely contributed to her hypokalemia and was discontinued.  Blood pressures were adequately controlled without medications inpatient.      TCU ISSUES    Disposition: Her mantra has been that her legs \"hurt pretty bad,\" that the legs are just \"killing me,\" and that she cannot stand on her feet, and her \"toes just don't move.\"  Pain, apparently located in the thighs, right above the knees bilaterally, is increased " "when sitting upright.  She is really unable to describe the pain other than telling me that \"it just hurts,\" no matter how many different ways she is asked.  The result has been just more frustration.  The right side may or may not be a bit worse than the left.  X-rays obtained in the hospital show a small loose foreign body in the right knee.  Without really any changes, and my last visit she told me that her legs are \"kind of sore but not as sore as when I came here.\"    Thiamine: I earlier noted that she is on thiamine, although I did not see a diagnosis of alcoholism/alcohol abuse.  I believe this is a treatment for something other than this; however, I am still wondering about possible Warnicke's.  She tells me she drank \"years ago  Maybe 10 years.\"  She liked [employer] and drank most days after work.  She also says she used to be a smoker but stopped when she started coughing \"maybe 2, 3 months ago.\"    Diabetes mellitus type 2: Interestingly, the hospital discharge summary notes that prior to admission she was receiving 500 mg of metformin daily for diabetes mellitus type 2; however, the patient told me, \"I didn't even know I was [a diabetic].  I'm not a sweet eater.\"  She seemed unaware that she was receiving metformin.  We did order speech therapy to evaluate and treat for cognition.  I am told she does poorly on short-term recall (words after 5 minutes).    Imaging studies: A CT of the head to evaluate for numbness and motor weakness did reveal chronic microvascular changes with mild generalized volume loss with preferential atrophy of the anterior and medial temporal lobes, but no acute intracranial process.    Therapy/pain: Therapy did report bilateral muscle wasting and that she cannot dorsiflex the right foot.  She has been bed ridden for at least the last 2 to 3 months, and it is torture for her and apparently previously active person.  She is hardly eating and still complaining of leg pain " "when up, okay when lying down.  Considering sending her to the pain clinic or to rheumatology.  She has not been to either. She has, apparently, been a resident in several facilities.  As of 08/07/2020, she is no longer receiving therapy.  The PT manager stated, \"we can't even touch her leg.\"  They also noted that she has a complete lack of motivation.    Muscle spasms/neuropathy: Rather than go with a narcotic analgesic, we initiated cyclobenzaprine 10 mg 3 times daily as needed.  However, she has been refusing this, and my assumption is that it is ineffective.  Otherwise, she is only receiving 500 to 1000 mg of acetaminophen every 4 hours as needed.  We added gabapentin 100 mg 3 times daily and also obtained a CMP.  Of particular note is a total protein of 4.8, albumin 2.2, indicating moderate protein-calorie malnutrition.  At my last visit, we increased her gabapentin from 100 mg 3 times daily to 200 mg 3 times daily.  Two days ago, we increased the dose to 300 mg 3 times daily.  We then did a gradual taper up to 600 mg 3 times daily.     She tells me, \"my feet are still the same.\"    Pressure wound: Nursing staff report a left heel pressure area (not open), but she is already wearing bilateral Rooke boots.  It is moderately painful.  She does indicate that the bottoms of her feet feel numb.    Discharge planning: Expected discharge date of 08/25/2020.  She will require home health aide, home health nurse, PT, OT, and .      ROS: No headaches, chest pains, dizziness or dyspnea, dysuria, constipation or diarrhea, difficulty chewing or swallowing.    Past Medical History:   Diagnosis Date     Hypertension      Protein-calorie malnutrition, moderate (H) 8/9/2020              Family History   Problem Relation Age of Onset     Acute Myocardial Infarction Neg Hx      Social History     Socioeconomic History     Marital status:      Spouse name: Not on file     Number of children: Not on file     " Years of education: Not on file     Highest education level: Not on file   Occupational History     Not on file   Social Needs     Financial resource strain: Not on file     Food insecurity     Worry: Not on file     Inability: Not on file     Transportation needs     Medical: Not on file     Non-medical: Not on file   Tobacco Use     Smoking status: Current Some Day Smoker     Packs/day: 2.86     Years: 61.00     Pack years: 174.46     Smokeless tobacco: Never Used   Substance and Sexual Activity     Alcohol use: Never     Frequency: Never     Drug use: Never     Sexual activity: Not on file   Lifestyle     Physical activity     Days per week: Not on file     Minutes per session: Not on file     Stress: Not on file   Relationships     Social connections     Talks on phone: Not on file     Gets together: Not on file     Attends Mosque service: Not on file     Active member of club or organization: Not on file     Attends meetings of clubs or organizations: Not on file     Relationship status: Not on file     Intimate partner violence     Fear of current or ex partner: Not on file     Emotionally abused: Not on file     Physically abused: Not on file     Forced sexual activity: Not on file   Other Topics Concern     Not on file   Social History Narrative     Not on file       MEDICATIONS: Reviewed from the MAR, physician orders, and/or earlier progress notes.  Post Discharge Medication Reconciliation Status: medication reconciliation previously completed during another office visit.      Current Outpatient Medications   Medication Sig     acetaminophen (TYLENOL) 650 MG CR tablet Take 650 mg by mouth every morning.     atorvastatin (LIPITOR) 10 MG tablet Take 1 tablet (10 mg total) by mouth at bedtime.     cyclobenzaprine (FLEXERIL) 10 MG tablet Take 10 mg by mouth 3 (three) times a day as needed for muscle spasms.     gabapentin (NEURONTIN) 100 MG capsule Take 600 mg by mouth 3 (three) times a day. Increase  "gabapentin from 300 mg 3 times daily to 400 mg 3 times daily for 3 days, then  Increase gabapentin from 400 mg 3 times daily to 500 mg 3 times daily for 3 days, then  Increase gabapentin from 500 mg 3 times daily to 600 mg 3 times daily     megestroL (MEGACE) 400 mg/10 mL (40 mg/mL) Take 800 mg by mouth daily.     metFORMIN (GLUCOPHAGE-XR) 500 MG 24 hr tablet Take 500 mg by mouth daily.     multivitamin with minerals (THERA-M) 9 mg iron-400 mcg Tab tablet Take 1 tablet by mouth daily.     polyethylene glycol (MIRALAX) 17 gram packet Take 1 packet (17 g total) by mouth daily.     thiamine 100 MG tablet Take 1 tablet (100 mg total) by mouth daily.     ALLERGIES: No Known Allergies    DIET: Regular, regular texture, thin liquids.  Mighty shake sugar-free.    Vitals:    08/24/20 1440   BP: 131/53   Pulse: 82   Resp: 18   Temp: 98.3  F (36.8  C)   SpO2: 95%   Weight: 153 lb (69.4 kg)   Height: 5' 7.5\" (1.715 m)   Questionable weight, as she has also been documented as weighing 20 pounds more and about 20 pounds less.  Pulse is generally in the 80s to 90s, occasionally more or less.  Body mass index is 23.61 kg/m .    EXAMINATION:   General: Fairly pleasant, alert, and conversant elderly female, looking much younger than her stated age, lying in bed, in NAD.  Head: Normocephalic and atraumatic.   Eyes: PERRLA, sclerae clear.   ENT: Moist oral mucosa.   Cardiovascular: Previously: Regular rate and rhythm with no appreciable murmur.  Chest not auscultated today due to ongoing COVID-19 precautions.  Respiratory: Previously: Loose, phlegmy cough has returned.  Lungs are still clear to auscultation bilaterally.  Chest not auscultated today due to ongoing COVID-19 precautions.  Abdomen: Nondistended.   Musculoskeletal/Extremities: Age-related degenerative joint disease.  Left trace pedal edema.  Integument: No rashes, clinically significant lesions, or skin breakdown.   Cognitive/Psychiatric: Generally appears alert and " oriented, although I question the possibility of some cognitive deficits, and she has been seen by SLP.  When she is not painful, affect is euthymic.    DIAGNOSTICS:     Results for orders placed or performed in visit on 08/05/20   Comprehensive Metabolic Panel   Result Value Ref Range    Sodium 142 136 - 145 mmol/L    Potassium 4.1 3.5 - 5.0 mmol/L    Chloride 107 98 - 107 mmol/L    CO2 25 22 - 31 mmol/L    Anion Gap, Calculation 10 5 - 18 mmol/L    Glucose 94 70 - 125 mg/dL    BUN 8 8 - 28 mg/dL    Creatinine 0.55 (L) 0.60 - 1.10 mg/dL    GFR MDRD Af Amer >60 >60 mL/min/1.73m2    GFR MDRD Non Af Amer >60 >60 mL/min/1.73m2    Bilirubin, Total 0.3 0.0 - 1.0 mg/dL    Calcium 8.2 (L) 8.5 - 10.5 mg/dL    Protein, Total 4.8 (L) 6.0 - 8.0 g/dL    Albumin 2.2 (L) 3.5 - 5.0 g/dL    Alkaline Phosphatase 109 45 - 120 U/L    AST 17 0 - 40 U/L    ALT 15 0 - 45 U/L     Lab Results   Component Value Date    WBC 10.8 07/27/2020    HGB 9.0 (L) 07/27/2020    HCT 28.1 (L) 07/27/2020    MCV 85 07/27/2020     (H) 07/28/2020     CrCl cannot be calculated (Patient's most recent lab result is older than the maximum 10 days allowed.).  Lab Results   Component Value Date    HGBA1C 7.3 (H) 05/10/2020       ASSESSMENT/Plan:      ICD-10-CM    1. Pain in both lower extremities  M79.604     M79.605    2. Idiopathic peripheral neuropathy  G60.9    3. Short-term memory loss  R41.3    4. Failure to thrive (0-17)  R62.51    5. Essential hypertension, benign  I10    6. Atrophy of muscle of multiple sites  M62.59    7. Unable to balance when standing  R27.8    8. Hypokalemia  E87.6      DISCHARGE PLAN/FACE TO FACE:  I certify that this patient is under my care and that I had a face-to-face encounter that meets the physician face-to-face encounter requirements with this patient.     Date of Face-to-Face Encounter:  08/24/2020     I certify that, based on my findings, the following services are medically necessary home health services: Home  health aide, home health nurse, home PT, home OT, visiting     My clinical findings support the need for the above skilled services because: (Please write a brief narrative summary that describes what the RN, PT, SLP, or other services will be doing in the home. A list of diagnoses in this section does not meet the CMS requirements): Home health aide to assist with ADLs such as bathing, home health nurse for medication management, home PT and OT to continue therapies in the home setting, and  to work out details of her vulnerable adult status.    This patient is homebound because: (Please write a brief narrative summmary describing the functional limitations as to why this patient is homebound and specifically what makes this patient homebound.):  Patient is not only nonambulatory, she is unable to sit for very long and is not only homebound but bedbound.  Also, above services necessarily need to be performed in the home to be of benefit.    The patient is, or has been, under my care and I have initiated the establishment of the plan of care. This patient will be followed by a physician who will periodically review the plan of care.  Initial follow-up should be within 7-10 days.    Approximate time spent with this patient was greater than 30 minutes with greater than 50% spent in discussions regarding services required upon discharge.    The above has been created using voice recognition software. Please be aware that this may unintentionally  produce inaccuracies and/or nonsensical sentences.      Electronically signed by: Trevor Reeder CNP

## 2021-07-20 NOTE — TELEPHONE ENCOUNTER
[facility] Nurse Triage Telephone Note      Provider: ONUR Carter  Facility: [facility]    Facility Type: TCU    Caller: Lisbet  Call Back Number:  [phone]    Allergies: Patient has no known allergies.    Reason for call: Nurse reporting that patient continues to have poor intake.  Patient only a bite of a banana today.  She also continues to c/o leg pain with movement, that goes from her knees down to her feet.  She also endorses that the bottoms of her feet are numb.  Patient is non-compliant with therapy.  She does have orders for Mighty Shakes three times a day since 8/3, however she's refused them three times.  No edema noted to her feet and legs.  She does have an area of breakdown on her left heel from pressure.  She's currently wearing pressure reducing boots.  Notable meds:  Gabapentin 100mg three times a day, Flexaril 10mg three times a day PRN.       Verbal Order/Direction given by Provider: Start Megace suspension 800mg daily.  Increase Gabapentin to 200mg three times a day.  Do a lumbar and sacral spine x-ray in the morning.      Provider giving order: ONUR Carter    Verbal order given to: Lisbet Avila RN

## 2021-07-20 NOTE — PROGRESS NOTES
"[facility]    Name:   Veronica Dia  : 1940  Facility:   [facility] [[id number]]   Room:   Code Status: FULL CODE -   Fac type:   SNF (Skilled Nursing Facility, TCU) -     PCP:  Juve Lundberg MD    CHIEF COMPLAINT / REASON FOR VISIT:  Chief Complaint   Patient presents with     Follow-up     TCU follow-up after hospitalization for leg pain, failure to thrive, hypokalemia, hypomagnesemia, hypophosphatemia, and right lower lobe pneumonia.      [facility] from 2020 until 2020 (failure to thrive/severe malnutrition, community-acquired pneumonia, hypokalemia, hypomagnesemia, and hypophosphatemia)      HPI: Veronica is a 80 y.o. female who presented to the ED on 2020 with leg pain and failure to thrive, who was found to have hypokalemia (2.4), hypomagnesemia, and hypophosphatemia.  Electrolytes were replaced.  Physical and occupational therapies and care management were consulted.  She was also treated for community-acquired pneumonia with azithromycin and ceftriaxone, then transitioned to Augmentin and azithromycin (ending on 2020).  A head CT revealed chronic microvascular changes and volume loss but did not reveal any acute intracranial process.  The patient's leg pain was able to be adequately managed with oral and topical medications, and she was discharged to the TCU on 2020.     She had been on chlorthalidone 5 mg daily prior to admission.  They felt this likely contributed to her hypokalemia and was discontinued.  Blood pressures were adequately controlled without medications inpatient.      TCU ISSUES    Disposition: She tells me that her legs \"hurt pretty bad (10/10),\" and she claims that she cannot stand on her feet.  She tells me that her \"toes just don't move.\"  I am informed by nursing that the pain, located right above the knee on both sides, is increased when sitting up, and they had to put her back to bed.  " "Patient is really unable to describe the pain other than telling me that it hurts.  The right side is a bit worse.  X-rays obtained in the hospital show a small loose foreign body in the right knee.     I note that she is on thiamine, although I do not see a diagnosis of alcoholism/alcohol abuse.  Wondering about Warnicke's.  Interestingly, the hospital discharge summary notes that prior to admission she was receiving 500 mg of metformin daily for diabetes mellitus type 2; however, the patient told me, \"I didn't even know I was [a diabetic].  I'm not a sweet eater.\"  She seemed unaware that she was receiving metformin.  We will order speech therapy to evaluate and treat for cognition.  A CT of the head to evaluate for numbness and motor weakness did reveal chronic microvascular changes with mild generalized volume loss with preferential atrophy of the anterior and medial temporal lobes, but no acute intracranial process.    Rather than go with a narcotic analgesic, we will initiate cyclobenzaprine 10 mg 3 times daily as needed.  Currently, she is only receiving 500 to 1000 mg of acetaminophen every 4 hours as needed.    Nursing staff report a left heel pressure area (not open), but she is already wearing bilateral Rooke boots.  I had checked the right heel and noted no abnormalities there.    She does have a loose, phlegmy cough, but her lung sounds are otherwise clear.    Labs: Follow-up from the hospital planned for 07/31/2020.      ROS: No headaches, chest pains, dizziness or dyspnea, dysuria, constipation or diarrhea, difficulty chewing or swallowing.    Past Medical History:   Diagnosis Date     Hypertension               Family History   Problem Relation Age of Onset     Acute Myocardial Infarction Neg Hx      Social History     Socioeconomic History     Marital status:      Spouse name: Not on file     Number of children: Not on file     Years of education: Not on file     Highest education level: Not " on file   Occupational History     Not on file   Social Needs     Financial resource strain: Not on file     Food insecurity     Worry: Not on file     Inability: Not on file     Transportation needs     Medical: Not on file     Non-medical: Not on file   Tobacco Use     Smoking status: Current Some Day Smoker     Packs/day: 2.86     Years: 61.00     Pack years: 174.46     Smokeless tobacco: Never Used   Substance and Sexual Activity     Alcohol use: Never     Frequency: Never     Drug use: Never     Sexual activity: Not on file   Lifestyle     Physical activity     Days per week: Not on file     Minutes per session: Not on file     Stress: Not on file   Relationships     Social connections     Talks on phone: Not on file     Gets together: Not on file     Attends Zoroastrian service: Not on file     Active member of club or organization: Not on file     Attends meetings of clubs or organizations: Not on file     Relationship status: Not on file     Intimate partner violence     Fear of current or ex partner: Not on file     Emotionally abused: Not on file     Physically abused: Not on file     Forced sexual activity: Not on file   Other Topics Concern     Not on file   Social History Narrative     Not on file       MEDICATIONS: Reviewed from the MAR, physician orders, and/or earlier progress notes.  Post Discharge Medication Reconciliation Status: discharge medications reconciled and changed, per note/orders.  Updated by me today (07/29/2020) with the addition of cyclobenzaprine reflected below.  Current Outpatient Medications   Medication Sig     cyclobenzaprine (FLEXERIL) 10 MG tablet Take 10 mg by mouth 3 (three) times a day as needed for muscle spasms.     acetaminophen (TYLENOL) 650 MG CR tablet Take 650 mg by mouth every morning.     amoxicillin-clavulanate (AUGMENTIN) 875-125 mg per tablet Take 1 tablet by mouth 2 (two) times a day for 5 days.     atorvastatin (LIPITOR) 10 MG tablet Take 1 tablet (10 mg total)  "by mouth at bedtime.     azithromycin (ZITHROMAX) 250 MG tablet Take 500 mg (2 x 250 mg tablets) on day 1 followed by 250 mg (1 tablet) on days 2-5.     metFORMIN (GLUCOPHAGE-XR) 500 MG 24 hr tablet Take 500 mg by mouth daily.     multivitamin with minerals (THERA-M) 9 mg iron-400 mcg Tab tablet Take 1 tablet by mouth daily.     polyethylene glycol (MIRALAX) 17 gram packet Take 1 packet (17 g total) by mouth daily.     thiamine 100 MG tablet Take 1 tablet (100 mg total) by mouth daily.     ALLERGIES: No Known Allergies    DIET: Regular, regular texture, thin liquids.  Mighty shake sugar-free.    Vitals:    07/29/20 1341   BP: 93/74   Pulse: 74   Resp: 20   Temp: 98.7  F (37.1  C)   SpO2: 94%   Weight: 137 lb 9.6 oz (62.4 kg)   Height: 5' 7.5\" (1.715 m)     Body mass index is 21.23 kg/m .    EXAMINATION:   General: Fairly pleasant, alert, and conversant elderly female, lying in bed, in no apparent distress.  Head: Normocephalic and atraumatic.   Eyes: PERRLA, sclerae clear.   ENT: Moist oral mucosa.   Cardiovascular: Regular rate and rhythm with no appreciable murmur.   Respiratory: Loose, phlegmy bronchial sounds.  Otherwise, lungs clear to auscultation bilaterally.   Abdomen: Soft and nontender.   Musculoskeletal/Extremities: Age-related degenerative joint disease.  Bilateral trace to 1+ pedal edema.  Integument: No rashes, clinically significant lesions, or skin breakdown.   Cognitive/Psychiatric: Generally appears alert and oriented, although I question the possibility of some cognitive deficits, and we are requesting SLP to eval and treat.    DIAGNOSTICS:   Results for orders placed or performed during the hospital encounter of 07/23/20   Basic Metabolic Panel   Result Value Ref Range    Sodium 138 136 - 145 mmol/L    Potassium 4.1 3.5 - 5.0 mmol/L    Chloride 109 (H) 98 - 107 mmol/L    CO2 23 22 - 31 mmol/L    Anion Gap, Calculation 6 5 - 18 mmol/L    Glucose 132 (H) 70 - 125 mg/dL    Calcium 7.6 (L) 8.5 - 10.5 " mg/dL    BUN 3 (L) 8 - 28 mg/dL    Creatinine 0.47 (L) 0.60 - 1.10 mg/dL    GFR MDRD Af Amer >60 >60 mL/min/1.73m2    GFR MDRD Non Af Amer >60 >60 mL/min/1.73m2         Lab Results   Component Value Date    WBC 10.8 07/27/2020    HGB 9.0 (L) 07/27/2020    HCT 28.1 (L) 07/27/2020    MCV 85 07/27/2020     (H) 07/28/2020     Estimated Creatinine Clearance: 95.6 mL/min (A) (by C-G formula based on SCr of 0.47 mg/dL (L)).  Lab Results   Component Value Date    HGBA1C 7.3 (H) 05/10/2020       ASSESSMENT/Plan:      ICD-10-CM    1. Failure to thrive (0-17)  R62.51    2. Pain in both lower extremities  M79.604     M79.605    3. Pneumonia of right lower lobe due to infectious organism  J18.9    4. Hypokalemia  E87.6    5. Essential hypertension, benign  I10      CHANGES:    Add cyclobenzaprine 10 mg 3 times daily as needed for leg pain.    CARE PLAN:    The care plan has been reviewed and all orders signed. Changes to care plan, if any, as noted. Otherwise, continue current plan of care.  Total time spent this patient was approximately 35 minutes, with greater than 50% spent in counseling and coordination of care that involved reviewing issues of pain with nursing staff.  These issues will be followed closely.  Orders were written for medication to hopefully provide some relief.    The above has been created using voice recognition software. Please be aware that this may unintentionally  produce inaccuracies and/or nonsensical sentences.      Electronically signed by: Trevor Reeder, PEDRO

## 2021-07-20 NOTE — PROGRESS NOTES
"[facility]    Name:   Veronica Dia  : 1940  Facility:   [facility] [[id number]]   Room:   Code Status: FULL CODE -   Fac type:   SNF (Skilled Nursing Facility, TCU) -     PCP:  Juve Lundberg MD    CHIEF COMPLAINT / REASON FOR VISIT:  Chief Complaint   Patient presents with     Follow-up     TCU follow-up after hospitalization for leg pain, failure to thrive, hypokalemia, hypomagnesemia, hypophosphatemia, and right lower lobe pneumonia.      [facility] from 2020 until 2020 (failure to thrive/severe malnutrition, community-acquired pneumonia, hypokalemia, hypomagnesemia, and hypophosphatemia)    Patient was last seen by me on 08/10/2020.      HPI: Veronica is a 80 y.o. female who presented to the ED on 2020 with leg pain and failure to thrive, who was found to have hypokalemia (2.4), hypomagnesemia, and hypophosphatemia.  Electrolytes were replaced.  Physical and occupational therapies and care management were consulted.  She was also treated for community-acquired pneumonia with azithromycin and ceftriaxone, then transitioned to Augmentin and azithromycin (ending on 2020).  A head CT revealed chronic microvascular changes and volume loss but did not reveal any acute intracranial process.  The patient's leg pain was able to be adequately managed with oral and topical medications, and she was discharged to the TCU on 2020.     She had been on chlorthalidone 5 mg daily prior to admission.  They felt this likely contributed to her hypokalemia and was discontinued.  Blood pressures were adequately controlled without medications inpatient.      TCU ISSUES    Disposition: Her mantra has been that her legs \"hurt pretty bad,\" that the legs are just \"killing me,\" and that she cannot stand on her feet, and her \"toes just don't move.\"  Pain, apparently located right above the knees bilaterally, is increased when sitting upright.  She " "is really unable to describe the pain other than telling me that \"it just hurts,\" no matter how many different ways she is asked.  The result has been just more frustration.  The right side may or may not be a bit worse than the left.  X-rays obtained in the hospital show a small loose foreign body in the right knee.  Without really any changes, and my last visit she told me that her legs are \"kind of sore but not as sore as when I came here.\"    I earlier noted that she is on thiamine, although I did not see a diagnosis of alcoholism/alcohol abuse.  I believe this is a treatment for something other than this; however, I am still wondering about possible Warnicke's.  She tells me she drank \"years ago  Maybe 10 years.\"  She liked [employer] and drank most days after work.  She also says she used to be a smoker but stopped when she started coughing \"maybe 2, 3 months ago.\"    Interestingly, the hospital discharge summary notes that prior to admission she was receiving 500 mg of metformin daily for diabetes mellitus type 2; however, the patient told me, \"I didn't even know I was [a diabetic].  I'm not a sweet eater.\"  She seemed unaware that she was receiving metformin.  We did order speech therapy to evaluate and treat for cognition.  I am told she does poorly on short-term recall (words after 5 minutes).    A CT of the head to evaluate for numbness and motor weakness did reveal chronic microvascular changes with mild generalized volume loss with preferential atrophy of the anterior and medial temporal lobes, but no acute intracranial process.    Therapy did report bilateral muscle wasting and that she cannot dorsiflex the right foot.  She has been bed ridden for at least the last 2 to 3 months, and it is torture for her and apparently previously active person.  She is hardly eating and still complaining of leg pain when up, okay when lying down.  Considering sending her to the pain clinic or to rheumatology.  " "She has not been to either. She has, apparently, been a resident in several facilities.  As of 08/07/2020, she is no longer receiving therapy.  The PT manager stated, \"we can't even touch her leg.\"  They also noted that she has a complete lack of motivation.    Rather than go with a narcotic analgesic, we initiated cyclobenzaprine 10 mg 3 times daily as needed.  However, she has been refusing this, and my assumption is that it is ineffective.  Otherwise, she is only receiving 500 to 1000 mg of acetaminophen every 4 hours as needed.  We added gabapentin 100 mg 3 times daily and also obtained a CMP.  Of particular note is a total protein of 4.8, albumin 2.2, indicating moderate protein-calorie malnutrition.  At my last visit, we increased her gabapentin from 100 mg 3 times daily to 200 mg 3 times daily.  Two days ago, we increased the dose to 300 mg 3 times daily.  We will do a gradual taper up to 600 mg 3 times daily.    Nursing staff report a left heel pressure area (not open), but she is already wearing bilateral Rooke boots.  She does indicate that the bottoms of her feet feel numb.      ROS: No headaches, chest pains, dizziness or dyspnea, dysuria, constipation or diarrhea, difficulty chewing or swallowing.    Past Medical History:   Diagnosis Date     Hypertension      Protein-calorie malnutrition, moderate (H) 8/9/2020              Family History   Problem Relation Age of Onset     Acute Myocardial Infarction Neg Hx      Social History     Socioeconomic History     Marital status:      Spouse name: Not on file     Number of children: Not on file     Years of education: Not on file     Highest education level: Not on file   Occupational History     Not on file   Social Needs     Financial resource strain: Not on file     Food insecurity     Worry: Not on file     Inability: Not on file     Transportation needs     Medical: Not on file     Non-medical: Not on file   Tobacco Use     Smoking status: Current " Some Day Smoker     Packs/day: 2.86     Years: 61.00     Pack years: 174.46     Smokeless tobacco: Never Used   Substance and Sexual Activity     Alcohol use: Never     Frequency: Never     Drug use: Never     Sexual activity: Not on file   Lifestyle     Physical activity     Days per week: Not on file     Minutes per session: Not on file     Stress: Not on file   Relationships     Social connections     Talks on phone: Not on file     Gets together: Not on file     Attends Shinto service: Not on file     Active member of club or organization: Not on file     Attends meetings of clubs or organizations: Not on file     Relationship status: Not on file     Intimate partner violence     Fear of current or ex partner: Not on file     Emotionally abused: Not on file     Physically abused: Not on file     Forced sexual activity: Not on file   Other Topics Concern     Not on file   Social History Narrative     Not on file       MEDICATIONS: Reviewed from the MAR, physician orders, and/or earlier progress notes.  Post Discharge Medication Reconciliation Status: medication reconciliation previously completed during another office visit.    Updated by me today (08/12/2020) with an increase in gabapentin from 300 mg 3 times daily to 600 mg 3 times daily reflected below.  Current Outpatient Medications   Medication Sig     acetaminophen (TYLENOL) 650 MG CR tablet Take 650 mg by mouth every morning.     atorvastatin (LIPITOR) 10 MG tablet Take 1 tablet (10 mg total) by mouth at bedtime.     cyclobenzaprine (FLEXERIL) 10 MG tablet Take 10 mg by mouth 3 (three) times a day as needed for muscle spasms.     gabapentin (NEURONTIN) 100 MG capsule Take 600 mg by mouth 3 (three) times a day. Increase gabapentin from 300 mg 3 times daily to 400 mg 3 times daily for 3 days, then  Increase gabapentin from 400 mg 3 times daily to 500 mg 3 times daily for 3 days, then  Increase gabapentin from 500 mg 3 times daily to 600 mg 3 times daily  "    megestroL (MEGACE) 400 mg/10 mL (40 mg/mL) Take 800 mg by mouth daily.     metFORMIN (GLUCOPHAGE-XR) 500 MG 24 hr tablet Take 500 mg by mouth daily.     multivitamin with minerals (THERA-M) 9 mg iron-400 mcg Tab tablet Take 1 tablet by mouth daily.     polyethylene glycol (MIRALAX) 17 gram packet Take 1 packet (17 g total) by mouth daily.     thiamine 100 MG tablet Take 1 tablet (100 mg total) by mouth daily.     ALLERGIES: No Known Allergies    DIET: Regular, regular texture, thin liquids.  Mighty shake sugar-free.    Vitals:    08/12/20 1727   BP: 136/67   Pulse: 87   Resp: 18   Temp: 98.7  F (37.1  C)   SpO2: 96%   Weight: 153 lb (69.4 kg)   Height: 5' 7.5\" (1.715 m)   Questionable weight, as she has also been documented as weighing 20 pounds more and about 20 pounds less.  Pulse is generally in the 80s to 90s, occasionally more or less.  Body mass index is 23.61 kg/m .    EXAMINATION:   General: Fairly pleasant, alert, and conversant elderly female, looking much younger than her stated age, lying in bed, looking very uncomfortable.  Head: Normocephalic and atraumatic.   Eyes: PERRLA, sclerae clear.   ENT: Moist oral mucosa.   Cardiovascular: Previously: Regular rate and rhythm with no appreciable murmur.  Chest not auscultated today due to ongoing COVID-19 precautions.  Respiratory: Previously: Loose, phlegmy cough has returned.  Lungs are still clear to auscultation bilaterally.  Chest not auscultated today due to ongoing COVID-19 precautions.  Abdomen: Soft and nontender.   Musculoskeletal/Extremities: Age-related degenerative joint disease.  Left trace pedal edema.  Integument: No rashes, clinically significant lesions, or skin breakdown.   Cognitive/Psychiatric: Generally appears alert and oriented, although I question the possibility of some cognitive deficits, and she has been seen by SLP.  When she is not painful, affect is euthymic.    DIAGNOSTICS:     Results for orders placed or performed in visit " on 08/05/20   Comprehensive Metabolic Panel   Result Value Ref Range    Sodium 142 136 - 145 mmol/L    Potassium 4.1 3.5 - 5.0 mmol/L    Chloride 107 98 - 107 mmol/L    CO2 25 22 - 31 mmol/L    Anion Gap, Calculation 10 5 - 18 mmol/L    Glucose 94 70 - 125 mg/dL    BUN 8 8 - 28 mg/dL    Creatinine 0.55 (L) 0.60 - 1.10 mg/dL    GFR MDRD Af Amer >60 >60 mL/min/1.73m2    GFR MDRD Non Af Amer >60 >60 mL/min/1.73m2    Bilirubin, Total 0.3 0.0 - 1.0 mg/dL    Calcium 8.2 (L) 8.5 - 10.5 mg/dL    Protein, Total 4.8 (L) 6.0 - 8.0 g/dL    Albumin 2.2 (L) 3.5 - 5.0 g/dL    Alkaline Phosphatase 109 45 - 120 U/L    AST 17 0 - 40 U/L    ALT 15 0 - 45 U/L     Lab Results   Component Value Date    WBC 10.8 07/27/2020    HGB 9.0 (L) 07/27/2020    HCT 28.1 (L) 07/27/2020    MCV 85 07/27/2020     (H) 07/28/2020     Estimated Creatinine Clearance: 90.9 mL/min (A) (by C-G formula based on SCr of 0.55 mg/dL (L)).  Lab Results   Component Value Date    HGBA1C 7.3 (H) 05/10/2020       ASSESSMENT/Plan:      ICD-10-CM    1. Failure to thrive (0-17)  R62.51    2. Pain in both lower extremities  M79.604     M79.605    3. Neuropathy  G62.9    4. History of RLL pneumonia  Z87.01    5. Protein-calorie malnutrition, moderate (H)  E44.0    6. Short-term memory loss  R41.3    7. Hypokalemia  E87.6    8. Essential hypertension, benign  I10      CHANGES:    Increase gabapentin from 300 mg 3 times daily to 400 mg 3 times daily for 3 days, then increase the dose to 500 mg 3 times daily for 3 days, then increase to 600 mg 3 times daily.    CARE PLAN:    The care plan has been reviewed and all orders signed. Changes to care plan, if any, as noted. Otherwise, continue current plan of care.      The above has been created using voice recognition software. Please be aware that this may unintentionally  produce inaccuracies and/or nonsensical sentences.      Electronically signed by: Trevor Reeder CNP

## 2021-07-20 NOTE — LETTER
Letter by Trevor Reeder CNP at      Author: Trevor Reeder CNP Service: -- Author Type: --    Filed:  Encounter Date: 8/3/2020 Status: (Other)         Patient: Veronica Dia   MR Number: [id number]   YOB: 1940   Date of Visit: 8/3/2020     [facility]    Name:   Veronica Dia  : 1940  Facility:   [facility] [[id number]]   Room:   Code Status: FULL CODE -   Fac type:   SNF (Skilled Nursing Facility, TCU) -     PCP:  Juve Lundberg MD    CHIEF COMPLAINT / REASON FOR VISIT:  Chief Complaint   Patient presents with   ? Follow-up     TCU follow-up after hospitalization for leg pain, failure to thrive, hypokalemia, hypomagnesemia, hypophosphatemia, and right lower lobe pneumonia.      [facility] from 2020 until 2020 (failure to thrive/severe malnutrition, community-acquired pneumonia, hypokalemia, hypomagnesemia, and hypophosphatemia)    Patient was last seen by me on 2020.      HPI: Veronica is a 80 y.o. female who presented to the ED on 2020 with leg pain and failure to thrive, who was found to have hypokalemia (2.4), hypomagnesemia, and hypophosphatemia.  Electrolytes were replaced.  Physical and occupational therapies and care management were consulted.  She was also treated for community-acquired pneumonia with azithromycin and ceftriaxone, then transitioned to Augmentin and azithromycin (ending on 2020).  A head CT revealed chronic microvascular changes and volume loss but did not reveal any acute intracranial process.  The patient's leg pain was able to be adequately managed with oral and topical medications, and she was discharged to the TCU on 2020.     She had been on chlorthalidone 5 mg daily prior to admission.  They felt this likely contributed to her hypokalemia and was discontinued.  Blood pressures were adequately controlled without medications inpatient.      TCU  "ISSUES    Disposition: Her mantra is that her legs \"hurt pretty bad (10/10),\" that her legs are just \"killing me,\" and that she cannot stand on her feet.  She told me that her \"toes just don't move.\"  I was informed by nursing and the patient that the pain, located right above the knee on both sides, is increased when sitting up, and they had to put her back to bed.  Patient is really unable to describe the pain other than telling me that \"it just hurts,\" no matter how many different ways I asked.  The result is just more frustration.  The right side is a bit worse than the left.  X-rays obtained in the hospital show a small loose foreign body in the right knee.  Blood pressures have been elevated, possibly due to pain.    I note that she is on thiamine, although I do not see a diagnosis of alcoholism/alcohol abuse.  Wondering about Warnicke's.  Interestingly, the hospital discharge summary notes that prior to admission she was receiving 500 mg of metformin daily for diabetes mellitus type 2; however, the patient told me, \"I didn't even know I was [a diabetic].  I'm not a sweet eater.\"  She seemed unaware that she was receiving metformin.  We did order speech therapy to evaluate and treat for cognition.  I am told she does poorly on short-term recall (words after 5 minutes).    A CT of the head to evaluate for numbness and motor weakness did reveal chronic microvascular changes with mild generalized volume loss with preferential atrophy of the anterior and medial temporal lobes, but no acute intracranial process.    Rather than go with a narcotic analgesic, we initiated cyclobenzaprine 10 mg 3 times daily as needed.  However, she has been refusing this, and my assumption is that it is ineffective.  Otherwise, she is only receiving 500 to 1000 mg of acetaminophen every 4 hours as needed.  We are going to go with gabapentin 100 mg 3 times daily.  We will also obtain a CMP.    Nursing staff report a left heel pressure " area (not open), but she is already wearing bilateral Rooke boots.  She does indicate that the bottoms of her feet feel numb.    Therapy reports bilateral muscle wasting and that she cannot dorsiflex the right foot.  She has been bed ridden for at least the last 2 to 3 months, and it is torture for her and apparently previously active person.  She is hardly eating, and she is screaming about leg pain today.  She apparently had been sitting too long.    She did have a loose, phlegmy cough at my last visit, but her lung sounds were otherwise clear, and there was no cough today.      ROS: No headaches, chest pains, dizziness or dyspnea, dysuria, constipation or diarrhea, difficulty chewing or swallowing.    Past Medical History:   Diagnosis Date   ? Hypertension               Family History   Problem Relation Age of Onset   ? Acute Myocardial Infarction Neg Hx      Social History     Socioeconomic History   ? Marital status:      Spouse name: Not on file   ? Number of children: Not on file   ? Years of education: Not on file   ? Highest education level: Not on file   Occupational History   ? Not on file   Social Needs   ? Financial resource strain: Not on file   ? Food insecurity     Worry: Not on file     Inability: Not on file   ? Transportation needs     Medical: Not on file     Non-medical: Not on file   Tobacco Use   ? Smoking status: Current Some Day Smoker     Packs/day: 2.86     Years: 61.00     Pack years: 174.46   ? Smokeless tobacco: Never Used   Substance and Sexual Activity   ? Alcohol use: Never     Frequency: Never   ? Drug use: Never   ? Sexual activity: Not on file   Lifestyle   ? Physical activity     Days per week: Not on file     Minutes per session: Not on file   ? Stress: Not on file   Relationships   ? Social connections     Talks on phone: Not on file     Gets together: Not on file     Attends Jehovah's witness service: Not on file     Active member of club or organization: Not on file      "Attends meetings of clubs or organizations: Not on file     Relationship status: Not on file   ? Intimate partner violence     Fear of current or ex partner: Not on file     Emotionally abused: Not on file     Physically abused: Not on file     Forced sexual activity: Not on file   Other Topics Concern   ? Not on file   Social History Narrative   ? Not on file       MEDICATIONS: Reviewed from the MAR, physician orders, and/or earlier progress notes.  Post Discharge Medication Reconciliation Status: medication reconciliation previously completed during another office visit.  Updated by me today (08/03/2020) with the addition of gabapentin reflected below.  Current Outpatient Medications   Medication Sig   ? gabapentin (NEURONTIN) 100 MG capsule Take 100 mg by mouth 3 (three) times a day.   ? acetaminophen (TYLENOL) 650 MG CR tablet Take 650 mg by mouth every morning.   ? atorvastatin (LIPITOR) 10 MG tablet Take 1 tablet (10 mg total) by mouth at bedtime.   ? cyclobenzaprine (FLEXERIL) 10 MG tablet Take 10 mg by mouth 3 (three) times a day as needed for muscle spasms.   ? metFORMIN (GLUCOPHAGE-XR) 500 MG 24 hr tablet Take 500 mg by mouth daily.   ? multivitamin with minerals (THERA-M) 9 mg iron-400 mcg Tab tablet Take 1 tablet by mouth daily.   ? polyethylene glycol (MIRALAX) 17 gram packet Take 1 packet (17 g total) by mouth daily.   ? thiamine 100 MG tablet Take 1 tablet (100 mg total) by mouth daily.     ALLERGIES: No Known Allergies    DIET: Regular, regular texture, thin liquids.  Mighty shake sugar-free.    Vitals:    08/03/20 1542   BP: 160/80   Pulse: (!) 111   Resp: 18   Temp: 96.9  F (36.1  C)   SpO2: 95%   Weight: 153 lb (69.4 kg)   Height: 5' 7.5\" (1.715 m)     Body mass index is 23.61 kg/m .    EXAMINATION:   General: Fairly pleasant, alert, and conversant elderly female, looking much younger than her stated age, up in a wheelchair, looking very uncomfortable.  Head: Normocephalic and atraumatic.   Eyes: " PERRLA, sclerae clear.   ENT: Moist oral mucosa.   Cardiovascular: Regular rate and rhythm with no appreciable murmur.   Respiratory: Previous loose, phlegmy bronchial sounds have resolved.  Lungs are now clear to auscultation bilaterally.   Abdomen: Soft and nontender.   Musculoskeletal/Extremities: Age-related degenerative joint disease.  Left trace pedal edema.  Integument: No rashes, clinically significant lesions, or skin breakdown.   Cognitive/Psychiatric: Generally appears alert and oriented, although I question the possibility of some cognitive deficits, and we are requesting SLP to eval and treat.    DIAGNOSTICS:   Results for orders placed or performed during the hospital encounter of 07/23/20   Basic Metabolic Panel   Result Value Ref Range    Sodium 138 136 - 145 mmol/L    Potassium 4.1 3.5 - 5.0 mmol/L    Chloride 109 (H) 98 - 107 mmol/L    CO2 23 22 - 31 mmol/L    Anion Gap, Calculation 6 5 - 18 mmol/L    Glucose 132 (H) 70 - 125 mg/dL    Calcium 7.6 (L) 8.5 - 10.5 mg/dL    BUN 3 (L) 8 - 28 mg/dL    Creatinine 0.47 (L) 0.60 - 1.10 mg/dL    GFR MDRD Af Amer >60 >60 mL/min/1.73m2    GFR MDRD Non Af Amer >60 >60 mL/min/1.73m2     Lab Results   Component Value Date    WBC 10.8 07/27/2020    HGB 9.0 (L) 07/27/2020    HCT 28.1 (L) 07/27/2020    MCV 85 07/27/2020     (H) 07/28/2020     Estimated Creatinine Clearance: 106.3 mL/min (A) (by C-G formula based on SCr of 0.47 mg/dL (L)).  Lab Results   Component Value Date    HGBA1C 7.3 (H) 05/10/2020       ASSESSMENT/Plan:      ICD-10-CM    1. Failure to thrive (0-17)  R62.51    2. Pain in both lower extremities  M79.604     M79.605    3. Pneumonia of right lower lobe due to infectious organism  J18.9    4. Hypokalemia  E87.6    5. Short-term memory loss  R41.3    6. Essential hypertension, benign  I10      CHANGES:    1.  Start gabapentin 100 mg 3 times daily.  2.  Obtain complete metabolic profile.    CARE PLAN:    The care plan has been reviewed and all  orders signed. Changes to care plan, if any, as noted. Otherwise, continue current plan of care.  Total time spent this patient was approximately 35 minutes, with greater than 50% spent in counseling and coordination of care that involved reviewing issues of pain again with nursing staff and also with physical and speech therapies.  These issues will be followed closely.  Orders were written for a new medication to hopefully provide some relief.    The above has been created using voice recognition software. Please be aware that this may unintentionally  produce inaccuracies and/or nonsensical sentences.      Electronically signed by: Trevor Reeder, PEDRO

## 2021-07-20 NOTE — PROGRESS NOTES
"[facility]    Name:   Veronica Dia  : 1940  Facility:   [facility] [[id number]]   Room:   Code Status: FULL CODE -   Fac type:   SNF (Skilled Nursing Facility, TCU) -     PCP:  Juve Lundberg MD    CHIEF COMPLAINT / REASON FOR VISIT:  Chief Complaint   Patient presents with     Follow-up     TCU follow-up after hospitalization for leg pain, failure to thrive, hypokalemia, hypomagnesemia, hypophosphatemia, and right lower lobe pneumonia.      [facility] from 2020 until 2020 (failure to thrive/severe malnutrition, community-acquired pneumonia, hypokalemia, hypomagnesemia, and hypophosphatemia)    Patient was last seen by me on 2020.      HPI: Veronica is a 80 y.o. female who presented to the ED on 2020 with leg pain and failure to thrive, who was found to have hypokalemia (2.4), hypomagnesemia, and hypophosphatemia.  Electrolytes were replaced.  Physical and occupational therapies and care management were consulted.  She was also treated for community-acquired pneumonia with azithromycin and ceftriaxone, then transitioned to Augmentin and azithromycin (ending on 2020).  A head CT revealed chronic microvascular changes and volume loss but did not reveal any acute intracranial process.  The patient's leg pain was able to be adequately managed with oral and topical medications, and she was discharged to the TCU on 2020.     She had been on chlorthalidone 5 mg daily prior to admission.  They felt this likely contributed to her hypokalemia and was discontinued.  Blood pressures were adequately controlled without medications inpatient.      TCU ISSUES    Disposition: Her mantra is that her legs \"hurt pretty bad (10/10),\" that her legs are just \"killing me,\" and that she cannot stand on her feet.  She told me that her \"toes just don't move.\"  I was informed by nursing and the patient that the pain, located right above the " "knee on both sides, is increased when sitting up, and they had to put her back to bed.  Patient is really unable to describe the pain other than telling me that \"it just hurts,\" no matter how many different ways I asked.  The result is just more frustration.  The right side is a bit worse than the left.  X-rays obtained in the hospital show a small loose foreign body in the right knee.  Blood pressures have been elevated, possibly due to pain.    I note that she is on thiamine, although I do not see a diagnosis of alcoholism/alcohol abuse.  Wondering about Warnicke's.  Interestingly, the hospital discharge summary notes that prior to admission she was receiving 500 mg of metformin daily for diabetes mellitus type 2; however, the patient told me, \"I didn't even know I was [a diabetic].  I'm not a sweet eater.\"  She seemed unaware that she was receiving metformin.  We did order speech therapy to evaluate and treat for cognition.  I am told she does poorly on short-term recall (words after 5 minutes).    A CT of the head to evaluate for numbness and motor weakness did reveal chronic microvascular changes with mild generalized volume loss with preferential atrophy of the anterior and medial temporal lobes, but no acute intracranial process.    Rather than go with a narcotic analgesic, we initiated cyclobenzaprine 10 mg 3 times daily as needed.  However, she has been refusing this, and my assumption is that it is ineffective.  Otherwise, she is only receiving 500 to 1000 mg of acetaminophen every 4 hours as needed.  We are going to go with gabapentin 100 mg 3 times daily.  We will also obtain a CMP.    Nursing staff report a left heel pressure area (not open), but she is already wearing bilateral Rooke boots.  She does indicate that the bottoms of her feet feel numb.    Therapy reports bilateral muscle wasting and that she cannot dorsiflex the right foot.  She has been bed ridden for at least the last 2 to 3 months, and " it is torture for her and apparently previously active person.  She is hardly eating, and she is screaming about leg pain today.  She apparently had been sitting too long.    She did have a loose, phlegmy cough at my last visit, but her lung sounds were otherwise clear, and there was no cough today.      ROS: No headaches, chest pains, dizziness or dyspnea, dysuria, constipation or diarrhea, difficulty chewing or swallowing.    Past Medical History:   Diagnosis Date     Hypertension               Family History   Problem Relation Age of Onset     Acute Myocardial Infarction Neg Hx      Social History     Socioeconomic History     Marital status:      Spouse name: Not on file     Number of children: Not on file     Years of education: Not on file     Highest education level: Not on file   Occupational History     Not on file   Social Needs     Financial resource strain: Not on file     Food insecurity     Worry: Not on file     Inability: Not on file     Transportation needs     Medical: Not on file     Non-medical: Not on file   Tobacco Use     Smoking status: Current Some Day Smoker     Packs/day: 2.86     Years: 61.00     Pack years: 174.46     Smokeless tobacco: Never Used   Substance and Sexual Activity     Alcohol use: Never     Frequency: Never     Drug use: Never     Sexual activity: Not on file   Lifestyle     Physical activity     Days per week: Not on file     Minutes per session: Not on file     Stress: Not on file   Relationships     Social connections     Talks on phone: Not on file     Gets together: Not on file     Attends Alevism service: Not on file     Active member of club or organization: Not on file     Attends meetings of clubs or organizations: Not on file     Relationship status: Not on file     Intimate partner violence     Fear of current or ex partner: Not on file     Emotionally abused: Not on file     Physically abused: Not on file     Forced sexual activity: Not on file   Other  "Topics Concern     Not on file   Social History Narrative     Not on file       MEDICATIONS: Reviewed from the MAR, physician orders, and/or earlier progress notes.  Post Discharge Medication Reconciliation Status: medication reconciliation previously completed during another office visit.  Updated by me today (08/03/2020) with the addition of gabapentin reflected below.  Current Outpatient Medications   Medication Sig     gabapentin (NEURONTIN) 100 MG capsule Take 100 mg by mouth 3 (three) times a day.     acetaminophen (TYLENOL) 650 MG CR tablet Take 650 mg by mouth every morning.     atorvastatin (LIPITOR) 10 MG tablet Take 1 tablet (10 mg total) by mouth at bedtime.     cyclobenzaprine (FLEXERIL) 10 MG tablet Take 10 mg by mouth 3 (three) times a day as needed for muscle spasms.     metFORMIN (GLUCOPHAGE-XR) 500 MG 24 hr tablet Take 500 mg by mouth daily.     multivitamin with minerals (THERA-M) 9 mg iron-400 mcg Tab tablet Take 1 tablet by mouth daily.     polyethylene glycol (MIRALAX) 17 gram packet Take 1 packet (17 g total) by mouth daily.     thiamine 100 MG tablet Take 1 tablet (100 mg total) by mouth daily.     ALLERGIES: No Known Allergies    DIET: Regular, regular texture, thin liquids.  Mighty shake sugar-free.    Vitals:    08/03/20 1542   BP: 160/80   Pulse: (!) 111   Resp: 18   Temp: 96.9  F (36.1  C)   SpO2: 95%   Weight: 153 lb (69.4 kg)   Height: 5' 7.5\" (1.715 m)     Body mass index is 23.61 kg/m .    EXAMINATION:   General: Fairly pleasant, alert, and conversant elderly female, looking much younger than her stated age, up in a wheelchair, looking very uncomfortable.  Head: Normocephalic and atraumatic.   Eyes: PERRLA, sclerae clear.   ENT: Moist oral mucosa.   Cardiovascular: Regular rate and rhythm with no appreciable murmur.   Respiratory: Previous loose, phlegmy bronchial sounds have resolved.  Lungs are now clear to auscultation bilaterally.   Abdomen: Soft and nontender. "   Musculoskeletal/Extremities: Age-related degenerative joint disease.  Left trace pedal edema.  Integument: No rashes, clinically significant lesions, or skin breakdown.   Cognitive/Psychiatric: Generally appears alert and oriented, although I question the possibility of some cognitive deficits, and we are requesting SLP to eval and treat.    DIAGNOSTICS:   Results for orders placed or performed during the hospital encounter of 07/23/20   Basic Metabolic Panel   Result Value Ref Range    Sodium 138 136 - 145 mmol/L    Potassium 4.1 3.5 - 5.0 mmol/L    Chloride 109 (H) 98 - 107 mmol/L    CO2 23 22 - 31 mmol/L    Anion Gap, Calculation 6 5 - 18 mmol/L    Glucose 132 (H) 70 - 125 mg/dL    Calcium 7.6 (L) 8.5 - 10.5 mg/dL    BUN 3 (L) 8 - 28 mg/dL    Creatinine 0.47 (L) 0.60 - 1.10 mg/dL    GFR MDRD Af Amer >60 >60 mL/min/1.73m2    GFR MDRD Non Af Amer >60 >60 mL/min/1.73m2     Lab Results   Component Value Date    WBC 10.8 07/27/2020    HGB 9.0 (L) 07/27/2020    HCT 28.1 (L) 07/27/2020    MCV 85 07/27/2020     (H) 07/28/2020     Estimated Creatinine Clearance: 106.3 mL/min (A) (by C-G formula based on SCr of 0.47 mg/dL (L)).  Lab Results   Component Value Date    HGBA1C 7.3 (H) 05/10/2020       ASSESSMENT/Plan:      ICD-10-CM    1. Failure to thrive (0-17)  R62.51    2. Pain in both lower extremities  M79.604     M79.605    3. Pneumonia of right lower lobe due to infectious organism  J18.9    4. Hypokalemia  E87.6    5. Short-term memory loss  R41.3    6. Essential hypertension, benign  I10      CHANGES:    1.  Start gabapentin 100 mg 3 times daily.  2.  Obtain complete metabolic profile.    CARE PLAN:    The care plan has been reviewed and all orders signed. Changes to care plan, if any, as noted. Otherwise, continue current plan of care.  Total time spent this patient was approximately 35 minutes, with greater than 50% spent in counseling and coordination of care that involved reviewing issues of pain again  with nursing staff and also with physical and speech therapies.  These issues will be followed closely.  Orders were written for a new medication to hopefully provide some relief.    The above has been created using voice recognition software. Please be aware that this may unintentionally  produce inaccuracies and/or nonsensical sentences.      Electronically signed by: Trevor Reeder CNP

## 2021-07-20 NOTE — PROGRESS NOTES
"[facility]    Name:   Veronica Dia  : 1940  Facility:   [facility] [[id number]]   Room:   Code Status: FULL CODE -   Fac type:   SNF (Skilled Nursing Facility, TCU) -     PCP:  Juve Lundberg MD    CHIEF COMPLAINT / REASON FOR VISIT:  Chief Complaint   Patient presents with     Follow-up     TCU follow-up after hospitalization for leg pain, failure to thrive, hypokalemia, hypomagnesemia, hypophosphatemia, and right lower lobe pneumonia.     [facility] - Face To Face     Assessments for durable Medical Equipment: Hospital bed, wheelchair, mechanical lift.      [facility] from 2020 until 2020 (failure to thrive/severe malnutrition, community-acquired pneumonia, hypokalemia, hypomagnesemia, and hypophosphatemia)    Patient was last seen by me on 2020.      HPI: Veronica is a 80 y.o. female who presented to the ED on 2020 with leg pain and failure to thrive, who was found to have hypokalemia (2.4), hypomagnesemia, and hypophosphatemia.  Electrolytes were replaced.  Physical and occupational therapies and care management were consulted.  She was also treated for community-acquired pneumonia with azithromycin and ceftriaxone, then transitioned to Augmentin and azithromycin (ending on 2020).  A head CT revealed chronic microvascular changes and volume loss but did not reveal any acute intracranial process.  The patient's leg pain was able to be adequately managed with oral and topical medications, and she was discharged to the TCU on 2020.     She had been on chlorthalidone 5 mg daily prior to admission.  They felt this likely contributed to her hypokalemia and was discontinued.  Blood pressures were adequately controlled without medications inpatient.      TCU ISSUES    Disposition: Her mantra has been that her legs \"hurt pretty bad,\" that the legs are just \"killing me,\" and that she cannot stand on her feet, " "and her \"toes just don't move.\"  Pain, apparently located in the thighs, right above the knees bilaterally, is increased when sitting upright.  She is really unable to describe the pain other than telling me that \"it just hurts,\" no matter how many different ways she is asked.  The result has been just more frustration.  The right side may or may not be a bit worse than the left.  X-rays obtained in the hospital show a small loose foreign body in the right knee.  Without really any changes, and my last visit she told me that her legs are \"kind of sore but not as sore as when I came here.\"    Thiamine: I earlier noted that she is on thiamine, although I did not see a diagnosis of alcoholism/alcohol abuse.  I believe this is a treatment for something other than this; however, I am still wondering about possible Warnicke's.  She tells me she drank \"years ago  Maybe 10 years.\"  She liked [employer] and drank most days after work.  She also says she used to be a smoker but stopped when she started coughing \"maybe 2, 3 months ago.\"    Diabetes mellitus type 2: Interestingly, the hospital discharge summary notes that prior to admission she was receiving 500 mg of metformin daily for diabetes mellitus type 2; however, the patient told me, \"I didn't even know I was [a diabetic].  I'm not a sweet eater.\"  She seemed unaware that she was receiving metformin.  We did order speech therapy to evaluate and treat for cognition.  I am told she does poorly on short-term recall (words after 5 minutes).    Imaging studies: A CT of the head to evaluate for numbness and motor weakness did reveal chronic microvascular changes with mild generalized volume loss with preferential atrophy of the anterior and medial temporal lobes, but no acute intracranial process.    Therapy/pain: Therapy did report bilateral muscle wasting and that she cannot dorsiflex the right foot.  She has been bed ridden for at least the last 2 to 3 months, and " "it is torture for her and apparently previously active person.  She is hardly eating and still complaining of leg pain when up, okay when lying down.  Considering sending her to the pain clinic or to rheumatology.  She has not been to either. She has, apparently, been a resident in several facilities.  As of 08/07/2020, she is no longer receiving therapy.  The PT manager stated, \"we can't even touch her leg.\"  They also noted that she has a complete lack of motivation.    Muscle spasms/neuropathy: Rather than go with a narcotic analgesic, we initiated cyclobenzaprine 10 mg 3 times daily as needed.  However, she has been refusing this, and my assumption is that it is ineffective.  Otherwise, she is only receiving 500 to 1000 mg of acetaminophen every 4 hours as needed.  We added gabapentin 100 mg 3 times daily and also obtained a CMP.  Of particular note is a total protein of 4.8, albumin 2.2, indicating moderate protein-calorie malnutrition.  At my last visit, we increased her gabapentin from 100 mg 3 times daily to 200 mg 3 times daily.  Two days ago, we increased the dose to 300 mg 3 times daily.  We then did a gradual taper up to 600 mg 3 times daily.     She tells me, \"my feet are still the same.\"    Pressure wound: Nursing staff report a left heel pressure area (not open), but she is already wearing bilateral [place] boots.  It is moderately painful.  She does indicate that the bottoms of her feet feel numb.      ROS: No headaches, chest pains, dizziness or dyspnea, dysuria, constipation or diarrhea, difficulty chewing or swallowing.    Past Medical History:   Diagnosis Date     Hypertension      Protein-calorie malnutrition, moderate (H) 8/9/2020              Family History   Problem Relation Age of Onset     Acute Myocardial Infarction Neg Hx      Social History     Socioeconomic History     Marital status:      Spouse name: Not on file     Number of children: Not on file     Years of education: Not on " file     Highest education level: Not on file   Occupational History     Not on file   Social Needs     Financial resource strain: Not on file     Food insecurity     Worry: Not on file     Inability: Not on file     Transportation needs     Medical: Not on file     Non-medical: Not on file   Tobacco Use     Smoking status: Current Some Day Smoker     Packs/day: 2.86     Years: 61.00     Pack years: 174.46     Smokeless tobacco: Never Used   Substance and Sexual Activity     Alcohol use: Never     Frequency: Never     Drug use: Never     Sexual activity: Not on file   Lifestyle     Physical activity     Days per week: Not on file     Minutes per session: Not on file     Stress: Not on file   Relationships     Social connections     Talks on phone: Not on file     Gets together: Not on file     Attends Christianity service: Not on file     Active member of club or organization: Not on file     Attends meetings of clubs or organizations: Not on file     Relationship status: Not on file     Intimate partner violence     Fear of current or ex partner: Not on file     Emotionally abused: Not on file     Physically abused: Not on file     Forced sexual activity: Not on file   Other Topics Concern     Not on file   Social History Narrative     Not on file       MEDICATIONS: Reviewed from the MAR, physician orders, and/or earlier progress notes.  Post Discharge Medication Reconciliation Status: medication reconciliation previously completed during another office visit.      Current Outpatient Medications   Medication Sig     acetaminophen (TYLENOL) 650 MG CR tablet Take 650 mg by mouth every morning.     atorvastatin (LIPITOR) 10 MG tablet Take 1 tablet (10 mg total) by mouth at bedtime.     cyclobenzaprine (FLEXERIL) 10 MG tablet Take 10 mg by mouth 3 (three) times a day as needed for muscle spasms.     gabapentin (NEURONTIN) 100 MG capsule Take 600 mg by mouth 3 (three) times a day. Increase gabapentin from 300 mg 3 times  "daily to 400 mg 3 times daily for 3 days, then  Increase gabapentin from 400 mg 3 times daily to 500 mg 3 times daily for 3 days, then  Increase gabapentin from 500 mg 3 times daily to 600 mg 3 times daily     megestroL (MEGACE) 400 mg/10 mL (40 mg/mL) Take 800 mg by mouth daily.     metFORMIN (GLUCOPHAGE-XR) 500 MG 24 hr tablet Take 500 mg by mouth daily.     multivitamin with minerals (THERA-M) 9 mg iron-400 mcg Tab tablet Take 1 tablet by mouth daily.     polyethylene glycol (MIRALAX) 17 gram packet Take 1 packet (17 g total) by mouth daily.     thiamine 100 MG tablet Take 1 tablet (100 mg total) by mouth daily.     ALLERGIES: No Known Allergies    DIET: Regular, regular texture, thin liquids.  Mighty shake sugar-free.    Vitals:    08/17/20 1501   BP: 146/85   Pulse: 84   Resp: 19   Temp: 98.3  F (36.8  C)   SpO2: 98%   Weight: 153 lb (69.4 kg)   Height: 5' 7.5\" (1.715 m)   Questionable weight, as she has also been documented as weighing 20 pounds more and about 20 pounds less.  Pulse is generally in the 80s to 90s, occasionally more or less.  Body mass index is 23.61 kg/m .    EXAMINATION:   General: Fairly pleasant, alert, and conversant elderly female, looking much younger than her stated age, lying in bed, in NAD.  Head: Normocephalic and atraumatic.   Eyes: PERRLA, sclerae clear.   ENT: Moist oral mucosa.   Cardiovascular: Previously: Regular rate and rhythm with no appreciable murmur.  Chest not auscultated today due to ongoing COVID-19 precautions.  Respiratory: Previously: Loose, phlegmy cough has returned.  Lungs are still clear to auscultation bilaterally.  Chest not auscultated today due to ongoing COVID-19 precautions.  Abdomen: Nondistended.   Musculoskeletal/Extremities: Age-related degenerative joint disease.  Left trace pedal edema.  Integument: No rashes, clinically significant lesions, or skin breakdown.   Cognitive/Psychiatric: Generally appears alert and oriented, although I question the " possibility of some cognitive deficits, and she has been seen by SLP.  When she is not painful, affect is euthymic.    DIAGNOSTICS:     Results for orders placed or performed in visit on 08/05/20   Comprehensive Metabolic Panel   Result Value Ref Range    Sodium 142 136 - 145 mmol/L    Potassium 4.1 3.5 - 5.0 mmol/L    Chloride 107 98 - 107 mmol/L    CO2 25 22 - 31 mmol/L    Anion Gap, Calculation 10 5 - 18 mmol/L    Glucose 94 70 - 125 mg/dL    BUN 8 8 - 28 mg/dL    Creatinine 0.55 (L) 0.60 - 1.10 mg/dL    GFR MDRD Af Amer >60 >60 mL/min/1.73m2    GFR MDRD Non Af Amer >60 >60 mL/min/1.73m2    Bilirubin, Total 0.3 0.0 - 1.0 mg/dL    Calcium 8.2 (L) 8.5 - 10.5 mg/dL    Protein, Total 4.8 (L) 6.0 - 8.0 g/dL    Albumin 2.2 (L) 3.5 - 5.0 g/dL    Alkaline Phosphatase 109 45 - 120 U/L    AST 17 0 - 40 U/L    ALT 15 0 - 45 U/L     Lab Results   Component Value Date    WBC 10.8 07/27/2020    HGB 9.0 (L) 07/27/2020    HCT 28.1 (L) 07/27/2020    MCV 85 07/27/2020     (H) 07/28/2020     CrCl cannot be calculated (Patient's most recent lab result is older than the maximum 10 days allowed.).  Lab Results   Component Value Date    HGBA1C 7.3 (H) 05/10/2020       ASSESSMENT/Plan:      ICD-10-CM    1. Pain in both lower extremities  M79.604     M79.605    2. Idiopathic peripheral neuropathy  G60.9    3. Short-term memory loss  R41.3    4. Failure to thrive (0-17)  R62.51    5. Essential hypertension, benign  I10    6. Atrophy of muscle of multiple sites  M62.59    7. Unable to balance when standing  R27.8    8. Hypokalemia  E87.6      FACE-TO-FACE for Durable Medical Equipment (DME):  Veronica Dia is currently under my medical care at [facility].  I had a face-to-face encounter with this patient on 8/17/2020.      Hospital bed: This patient will require a hospital bed upon discharge.  The patient will require positioning of the body in ways not feasible with an ordinary bed due to the need for total dependent  physical assist with all mobility by a caregiver secondary to chronic bilateral leg pain (M79.604/M79.605), peripheral neuropathy (G62.9) and muscle wasting (M62.5).  The patient requires positioning of the body in ways not feasible with an ordinary bed for alleviation of pain and prevention of pressure sores.  The patient will need bed height different than a fixed height hospital bed to permit transfers to wheelchair.  The patient requires frequent changes in body position and immediate need for change in body positioning due to tissue tolerance and as needed navjot-care.  I certify that, based on my findings, a hospital bed is medically necessary for the pt to discharge home.  Length of need is for lifetime.    Lightweight manual wheelchair: The patient has a mobility limitation due to chronic bilateral leg pain (M79.604/M79.605), peripheral neuropathy (G62.9) and muscle wasting (M62.5) that significantly impairs her ability to participate in one or more mobility related activities of daily living, such as toileting, dressing, grooming, or bathing.  The mobility limitation cannot be sufficiently resolved by the use of a cane or walker.  The patient's home provides adequate access between rooms, maneuvering space, and surfaces for the use of a lightweight manual wheelchair.  Use of the lightweight manual wheelchair will significantly improve the patient's ability to participate in mobility related activities and will be used on a regular basis in the home.  The patient does not have the ability to propel a standard wheelchair in the home but is capable of self-propelling a lightweight manual wheelchair. The patient has not expressed an unwillingness to use the wheelchair that is provided in the home.  The patient has a caregiver who is available, willing, and able to provide assistance with the wheelchair.  I certify that, based on my findings, a wheelchair is medically necessary for the pt to discharge home.   Length of need is for lifetime.    Mechanical patient lift: The patient has a mobility limitation due to chronic bilateral leg pain (M79.604/M79.605), peripheral neuropathy (G62.9), muscle wasting (M62.5), and inability to stand (R27.8) that significantly impairs her ability to participate in one or more mobility related activities of daily living, such as transferring, toileting, dressing, grooming, or bathing.  Transferring from bed to wheelchair, shower chair, or commode, etc. cannot be performed with the use of any other devices such as a slide board or transfer belt and requires the assistance of another person to accomplish this.  This can be accomplished, however, with the use of a lift, where otherwise the patient would be exclusively bedbound. I certify that, based on my findings, a mechanical lift is medically necessary for the pt to discharge home.  Length of need is for lifetime.      CHANGES:    None.    CARE PLAN:    The care plan has been reviewed and all orders signed. Changes to care plan, if any, as noted. Otherwise, continue current plan of care.  Total time spent with this patient was approximately 45 minutes, with greater than 50% spent in counseling and coordination of care that included consultations with staff and therapies regarding specific needs needing to be addressed with regard to durable medical equipment.    The above has been created using voice recognition software. Please be aware that this may unintentionally  produce inaccuracies and/or nonsensical sentences.      Electronically signed by: Trevor Reeder CNP

## 2021-07-20 NOTE — TELEPHONE ENCOUNTER
[facility] Nurse Triage Telephone Note      Provider: ONUR Carter  Facility: [facility]    Facility Type: TCU    Caller: Shivani  Call Back Number:  [phone]    Allergies: Patient has no known allergies.    Reason for call: Xray of lumbar spine d/t pain and numbness in bottom of feet     AP and lateral views of the lumbosacral spine are submitted without previous comparisons studies.  Findings.  There is moderate scoliosis convex to the right.  There is moderate disk space narrowing with vertebral body osteophyte formation of multiple lumbar levels.  Lumbar vertebral heights appears normal.  No fracture, spondylolisthesis or bone destruction is seen.  Impression:  1. Moderate dextroscoliosis.  2. Moderately severe multilevel degenerative spondylosis.  3. No lumbar fracture identified.  Verbal Order/Direction given by Provider: NNO      Provider giving order: ONUR Carter    Verbal order given to: Shivani Jarrell RN

## 2021-07-20 NOTE — LETTER
Letter by Trevor Reeder CNP at      Author: Trevor Reeder CNP Service: -- Author Type: --    Filed:  Encounter Date: 2020 Status: (Other)         Patient: Veronica Dia   MR Number: [id number]   YOB: 1940   Date of Visit: 2020     [facility]    Name:   Veronica Dia  : 1940  Facility:   [facility] [[id number]]   Room:   Code Status: FULL CODE -   Fac type:   SNF (Skilled Nursing Facility, TCU) -     PCP:  Juve Lundberg MD    CHIEF COMPLAINT / REASON FOR VISIT:  Chief Complaint   Patient presents with   ? Follow-up     TCU follow-up after hospitalization for leg pain, failure to thrive, hypokalemia, hypomagnesemia, hypophosphatemia, and right lower lobe pneumonia.   ? Clinic Care [facility] - Face To Face     Assessments for durable Medical Equipment: Hospital bed, wheelchair, mechanical lift.      [facility] from 2020 until 2020 (failure to thrive/severe malnutrition, community-acquired pneumonia, hypokalemia, hypomagnesemia, and hypophosphatemia)    Patient was last seen by me on 2020.      HPI: Veronica is a 80 y.o. female who presented to the ED on 2020 with leg pain and failure to thrive, who was found to have hypokalemia (2.4), hypomagnesemia, and hypophosphatemia.  Electrolytes were replaced.  Physical and occupational therapies and care management were consulted.  She was also treated for community-acquired pneumonia with azithromycin and ceftriaxone, then transitioned to Augmentin and azithromycin (ending on 2020).  A head CT revealed chronic microvascular changes and volume loss but did not reveal any acute intracranial process.  The patient's leg pain was able to be adequately managed with oral and topical medications, and she was discharged to the TCU on 2020.     She had been on chlorthalidone 5 mg daily prior to admission.  They felt this likely contributed  "to her hypokalemia and was discontinued.  Blood pressures were adequately controlled without medications inpatient.      TCU ISSUES    Disposition: Her mantra has been that her legs \"hurt pretty bad,\" that the legs are just \"killing me,\" and that she cannot stand on her feet, and her \"toes just don't move.\"  Pain, apparently located in the thighs, right above the knees bilaterally, is increased when sitting upright.  She is really unable to describe the pain other than telling me that \"it just hurts,\" no matter how many different ways she is asked.  The result has been just more frustration.  The right side may or may not be a bit worse than the left.  X-rays obtained in the hospital show a small loose foreign body in the right knee.  Without really any changes, and my last visit she told me that her legs are \"kind of sore but not as sore as when I came here.\"    Thiamine: I earlier noted that she is on thiamine, although I did not see a diagnosis of alcoholism/alcohol abuse.  I believe this is a treatment for something other than this; however, I am still wondering about possible Warnicke's.  She tells me she drank \"years ago? Maybe 10 years.\"  She liked [employer] and drank most days after work.  She also says she used to be a smoker but stopped when she started coughing \"maybe 2, 3 months ago.\"    Diabetes mellitus type 2: Interestingly, the hospital discharge summary notes that prior to admission she was receiving 500 mg of metformin daily for diabetes mellitus type 2; however, the patient told me, \"I didn't even know I was [a diabetic].  I'm not a sweet eater.\"  She seemed unaware that she was receiving metformin.  We did order speech therapy to evaluate and treat for cognition.  I am told she does poorly on short-term recall (words after 5 minutes).    Imaging studies: A CT of the head to evaluate for numbness and motor weakness did reveal chronic microvascular changes with mild generalized volume loss " "with preferential atrophy of the anterior and medial temporal lobes, but no acute intracranial process.    Therapy/pain: Therapy did report bilateral muscle wasting and that she cannot dorsiflex the right foot.  She has been bed ridden for at least the last 2 to 3 months, and it is torture for her and apparently previously active person.  She is hardly eating and still complaining of leg pain when up, okay when lying down.  Considering sending her to the pain clinic or to rheumatology.  She has not been to either. She has, apparently, been a resident in several facilities.  As of 08/07/2020, she is no longer receiving therapy.  The PT manager stated, \"we can't even touch her leg.\"  They also noted that she has a complete lack of motivation.    Muscle spasms/neuropathy: Rather than go with a narcotic analgesic, we initiated cyclobenzaprine 10 mg 3 times daily as needed.  However, she has been refusing this, and my assumption is that it is ineffective.  Otherwise, she is only receiving 500 to 1000 mg of acetaminophen every 4 hours as needed.  We added gabapentin 100 mg 3 times daily and also obtained a CMP.  Of particular note is a total protein of 4.8, albumin 2.2, indicating moderate protein-calorie malnutrition.  At my last visit, we increased her gabapentin from 100 mg 3 times daily to 200 mg 3 times daily.  Two days ago, we increased the dose to 300 mg 3 times daily.  We then did a gradual taper up to 600 mg 3 times daily.     She tells me, \"my feet are still the same.\"    Pressure wound: Nursing staff report a left heel pressure area (not open), but she is already wearing bilateral [place] boots.  It is moderately painful.  She does indicate that the bottoms of her feet feel numb.      ROS: No headaches, chest pains, dizziness or dyspnea, dysuria, constipation or diarrhea, difficulty chewing or swallowing.    Past Medical History:   Diagnosis Date   ? Hypertension    ? Protein-calorie malnutrition, moderate (H) " 8/9/2020              Family History   Problem Relation Age of Onset   ? Acute Myocardial Infarction Neg Hx      Social History     Socioeconomic History   ? Marital status:      Spouse name: Not on file   ? Number of children: Not on file   ? Years of education: Not on file   ? Highest education level: Not on file   Occupational History   ? Not on file   Social Needs   ? Financial resource strain: Not on file   ? Food insecurity     Worry: Not on file     Inability: Not on file   ? Transportation needs     Medical: Not on file     Non-medical: Not on file   Tobacco Use   ? Smoking status: Current Some Day Smoker     Packs/day: 2.86     Years: 61.00     Pack years: 174.46   ? Smokeless tobacco: Never Used   Substance and Sexual Activity   ? Alcohol use: Never     Frequency: Never   ? Drug use: Never   ? Sexual activity: Not on file   Lifestyle   ? Physical activity     Days per week: Not on file     Minutes per session: Not on file   ? Stress: Not on file   Relationships   ? Social connections     Talks on phone: Not on file     Gets together: Not on file     Attends Nondenominational service: Not on file     Active member of club or organization: Not on file     Attends meetings of clubs or organizations: Not on file     Relationship status: Not on file   ? Intimate partner violence     Fear of current or ex partner: Not on file     Emotionally abused: Not on file     Physically abused: Not on file     Forced sexual activity: Not on file   Other Topics Concern   ? Not on file   Social History Narrative   ? Not on file       MEDICATIONS: Reviewed from the MAR, physician orders, and/or earlier progress notes.  Post Discharge Medication Reconciliation Status: medication reconciliation previously completed during another office visit.      Current Outpatient Medications   Medication Sig   ? acetaminophen (TYLENOL) 650 MG CR tablet Take 650 mg by mouth every morning.   ? atorvastatin (LIPITOR) 10 MG tablet Take 1 tablet  "(10 mg total) by mouth at bedtime.   ? cyclobenzaprine (FLEXERIL) 10 MG tablet Take 10 mg by mouth 3 (three) times a day as needed for muscle spasms.   ? gabapentin (NEURONTIN) 100 MG capsule Take 600 mg by mouth 3 (three) times a day. Increase gabapentin from 300 mg 3 times daily to 400 mg 3 times daily for 3 days, then  Increase gabapentin from 400 mg 3 times daily to 500 mg 3 times daily for 3 days, then  Increase gabapentin from 500 mg 3 times daily to 600 mg 3 times daily   ? megestroL (MEGACE) 400 mg/10 mL (40 mg/mL) Take 800 mg by mouth daily.   ? metFORMIN (GLUCOPHAGE-XR) 500 MG 24 hr tablet Take 500 mg by mouth daily.   ? multivitamin with minerals (THERA-M) 9 mg iron-400 mcg Tab tablet Take 1 tablet by mouth daily.   ? polyethylene glycol (MIRALAX) 17 gram packet Take 1 packet (17 g total) by mouth daily.   ? thiamine 100 MG tablet Take 1 tablet (100 mg total) by mouth daily.     ALLERGIES: No Known Allergies    DIET: Regular, regular texture, thin liquids.  Mighty shake sugar-free.    Vitals:    08/17/20 1501   BP: 146/85   Pulse: 84   Resp: 19   Temp: 98.3  F (36.8  C)   SpO2: 98%   Weight: 153 lb (69.4 kg)   Height: 5' 7.5\" (1.715 m)   Questionable weight, as she has also been documented as weighing 20 pounds more and about 20 pounds less.  Pulse is generally in the 80s to 90s, occasionally more or less.  Body mass index is 23.61 kg/m .    EXAMINATION:   General: Fairly pleasant, alert, and conversant elderly female, looking much younger than her stated age, lying in bed, in NAD.  Head: Normocephalic and atraumatic.   Eyes: PERRLA, sclerae clear.   ENT: Moist oral mucosa.   Cardiovascular: Previously: Regular rate and rhythm with no appreciable murmur.  Chest not auscultated today due to ongoing COVID-19 precautions.  Respiratory: Previously: Loose, phlegmy cough has returned.  Lungs are still clear to auscultation bilaterally.  Chest not auscultated today due to ongoing COVID-19 precautions.  Abdomen: " Nondistended.   Musculoskeletal/Extremities: Age-related degenerative joint disease.  Left trace pedal edema.  Integument: No rashes, clinically significant lesions, or skin breakdown.   Cognitive/Psychiatric: Generally appears alert and oriented, although I question the possibility of some cognitive deficits, and she has been seen by SLP.  When she is not painful, affect is euthymic.    DIAGNOSTICS:     Results for orders placed or performed in visit on 08/05/20   Comprehensive Metabolic Panel   Result Value Ref Range    Sodium 142 136 - 145 mmol/L    Potassium 4.1 3.5 - 5.0 mmol/L    Chloride 107 98 - 107 mmol/L    CO2 25 22 - 31 mmol/L    Anion Gap, Calculation 10 5 - 18 mmol/L    Glucose 94 70 - 125 mg/dL    BUN 8 8 - 28 mg/dL    Creatinine 0.55 (L) 0.60 - 1.10 mg/dL    GFR MDRD Af Amer >60 >60 mL/min/1.73m2    GFR MDRD Non Af Amer >60 >60 mL/min/1.73m2    Bilirubin, Total 0.3 0.0 - 1.0 mg/dL    Calcium 8.2 (L) 8.5 - 10.5 mg/dL    Protein, Total 4.8 (L) 6.0 - 8.0 g/dL    Albumin 2.2 (L) 3.5 - 5.0 g/dL    Alkaline Phosphatase 109 45 - 120 U/L    AST 17 0 - 40 U/L    ALT 15 0 - 45 U/L     Lab Results   Component Value Date    WBC 10.8 07/27/2020    HGB 9.0 (L) 07/27/2020    HCT 28.1 (L) 07/27/2020    MCV 85 07/27/2020     (H) 07/28/2020     CrCl cannot be calculated (Patient's most recent lab result is older than the maximum 10 days allowed.).  Lab Results   Component Value Date    HGBA1C 7.3 (H) 05/10/2020       ASSESSMENT/Plan:      ICD-10-CM    1. Pain in both lower extremities  M79.604     M79.605    2. Idiopathic peripheral neuropathy  G60.9    3. Short-term memory loss  R41.3    4. Failure to thrive (0-17)  R62.51    5. Essential hypertension, benign  I10    6. Atrophy of muscle of multiple sites  M62.59    7. Unable to balance when standing  R27.8    8. Hypokalemia  E87.6      FACE-TO-FACE for Durable Medical Equipment (DME):  Veronica Dia is currently under my medical care at [facility]  TCU.  I had a face-to-face encounter with this patient on 8/17/2020.      Hospital bed: This patient will require a hospital bed upon discharge.  The patient will require positioning of the body in ways not feasible with an ordinary bed due to the need for total dependent physical assist with all mobility by a caregiver secondary to chronic bilateral leg pain (M79.604/M79.605), peripheral neuropathy (G62.9) and muscle wasting (M62.5).  The patient requires positioning of the body in ways not feasible with an ordinary bed for alleviation of pain and prevention of pressure sores.  The patient will need bed height different than a fixed height hospital bed to permit transfers to wheelchair.  The patient requires frequent changes in body position and immediate need for change in body positioning due to tissue tolerance and as needed navjot-care.  I certify that, based on my findings, a hospital bed is medically necessary for the pt to discharge home.  Length of need is for lifetime.    Lightweight manual wheelchair: The patient has a mobility limitation due to chronic bilateral leg pain (M79.604/M79.605), peripheral neuropathy (G62.9) and muscle wasting (M62.5) that significantly impairs her ability to participate in one or more mobility related activities of daily living, such as toileting, dressing, grooming, or bathing.  The mobility limitation cannot be sufficiently resolved by the use of a cane or walker.  The patient's home provides adequate access between rooms, maneuvering space, and surfaces for the use of a lightweight manual wheelchair.  Use of the lightweight manual wheelchair will significantly improve the patient's ability to participate in mobility related activities and will be used on a regular basis in the home.  The patient does not have the ability to propel a standard wheelchair in the home but is capable of self-propelling a lightweight manual wheelchair. The patient has not expressed an  unwillingness to use the wheelchair that is provided in the home.  The patient has a caregiver who is available, willing, and able to provide assistance with the wheelchair.  I certify that, based on my findings, a wheelchair is medically necessary for the pt to discharge home.  Length of need is for lifetime.    Mechanical patient lift: The patient has a mobility limitation due to chronic bilateral leg pain (M79.604/M79.605), peripheral neuropathy (G62.9), muscle wasting (M62.5), and inability to stand (R27.8) that significantly impairs her ability to participate in one or more mobility related activities of daily living, such as transferring, toileting, dressing, grooming, or bathing.  Transferring from bed to wheelchair, shower chair, or commode, etc. cannot be performed with the use of any other devices such as a slide board or transfer belt and requires the assistance of another person to accomplish this.  This can be accomplished, however, with the use of a lift, where otherwise the patient would be exclusively bedbound. I certify that, based on my findings, a mechanical lift is medically necessary for the pt to discharge home.  Length of need is for lifetime.      CHANGES:    None.    CARE PLAN:    The care plan has been reviewed and all orders signed. Changes to care plan, if any, as noted. Otherwise, continue current plan of care.  Total time spent with this patient was approximately 45 minutes, with greater than 50% spent in counseling and coordination of care that included consultations with staff and therapies regarding specific needs needing to be addressed with regard to durable medical equipment.    The above has been created using voice recognition software. Please be aware that this may unintentionally  produce inaccuracies and/or nonsensical sentences.      Electronically signed by: Trevor Reeder, PEDRO

## 2021-07-20 NOTE — PROGRESS NOTES
"[facility]    Name:   Veronica Dia  : 1940  Facility:   [facility] [[id number]]   Room:   Code Status: FULL CODE -   Fac type:   SNF (Skilled Nursing Facility, TCU) -     PCP:  Juve Lundberg MD    CHIEF COMPLAINT / REASON FOR VISIT:  Chief Complaint   Patient presents with     Follow-up     TCU follow-up after hospitalization for leg pain, failure to thrive, hypokalemia, hypomagnesemia, hypophosphatemia, and right lower lobe pneumonia.      [facility] from 2020 until 2020 (failure to thrive/severe malnutrition, community-acquired pneumonia, hypokalemia, hypomagnesemia, and hypophosphatemia)    Patient was last seen by me on 2020.      HPI: Veronica is a 80 y.o. female who presented to the ED on 2020 with leg pain and failure to thrive, who was found to have hypokalemia (2.4), hypomagnesemia, and hypophosphatemia.  Electrolytes were replaced.  Physical and occupational therapies and care management were consulted.  She was also treated for community-acquired pneumonia with azithromycin and ceftriaxone, then transitioned to Augmentin and azithromycin (ending on 2020).  A head CT revealed chronic microvascular changes and volume loss but did not reveal any acute intracranial process.  The patient's leg pain was able to be adequately managed with oral and topical medications, and she was discharged to the TCU on 2020.     She had been on chlorthalidone 5 mg daily prior to admission.  They felt this likely contributed to her hypokalemia and was discontinued.  Blood pressures were adequately controlled without medications inpatient.      TCU ISSUES    Disposition: Her mantra has been that her legs \"hurt pretty bad,\" that the legs are just \"killing me,\" and that she cannot stand on her feet, and her \"toes just don't move.\"  Pain, apparently located right above the knees bilaterally, is increased when sitting upright.  She " "is really unable to describe the pain other than telling me that \"it just hurts,\" no matter how many different ways she is asked.  The result has been just more frustration.  The right side may or may not be a bit worse than the left.  X-rays obtained in the hospital show a small loose foreign body in the right knee.  Without really any changes, and my last visit she told me that her legs are \"kind of sore but not as sore as when I came here.\"    I earlier noted that she is on thiamine, although I did not see a diagnosis of alcoholism/alcohol abuse.  I believe this is a treatment for something other than this; however, I am still wondering about possible Warnicke's.  She tells me she drank \"years ago  Maybe 10 years.\"  She liked [employer] and drank most days after work.  She also says she used to be a smoker but stopped when she started coughing \"maybe 2, 3 months ago.\"    Interestingly, the hospital discharge summary notes that prior to admission she was receiving 500 mg of metformin daily for diabetes mellitus type 2; however, the patient told me, \"I didn't even know I was [a diabetic].  I'm not a sweet eater.\"  She seemed unaware that she was receiving metformin.  We did order speech therapy to evaluate and treat for cognition.  I am told she does poorly on short-term recall (words after 5 minutes).    A CT of the head to evaluate for numbness and motor weakness did reveal chronic microvascular changes with mild generalized volume loss with preferential atrophy of the anterior and medial temporal lobes, but no acute intracranial process.    Rather than go with a narcotic analgesic, we initiated cyclobenzaprine 10 mg 3 times daily as needed.  However, she has been refusing this, and my assumption is that it is ineffective.  Otherwise, she is only receiving 500 to 1000 mg of acetaminophen every 4 hours as needed.  We added gabapentin 100 mg 3 times daily and also obtained a CMP.  Of particular note is a " total protein of 4.8, albumin 2.2, indicating moderate protein-calorie malnutrition.  At my last visit, we increased her gabapentin from 100 mg 3 times daily to 200 mg 3 times daily.  Today, we will increase the dose to 300 mg 3 times daily.    Nursing staff report a left heel pressure area (not open), but she is already wearing bilateral [place] boots.  She does indicate that the bottoms of her feet feel numb.    Therapy reports bilateral muscle wasting and that she cannot dorsiflex the right foot.  She has been bed ridden for at least the last 2 to 3 months, and it is torture for her and apparently previously active person.  She is hardly eating and still complaining of leg pain when up, okay when lying down.  Considering sending her to the pain clinic or to rheumatology.  She has not been to either. She has, apparently, been a resident in several facilities.      ROS: No headaches, chest pains, dizziness or dyspnea, dysuria, constipation or diarrhea, difficulty chewing or swallowing.    Past Medical History:   Diagnosis Date     Hypertension      Protein-calorie malnutrition, moderate (H) 8/9/2020              Family History   Problem Relation Age of Onset     Acute Myocardial Infarction Neg Hx      Social History     Socioeconomic History     Marital status:      Spouse name: Not on file     Number of children: Not on file     Years of education: Not on file     Highest education level: Not on file   Occupational History     Not on file   Social Needs     Financial resource strain: Not on file     Food insecurity     Worry: Not on file     Inability: Not on file     Transportation needs     Medical: Not on file     Non-medical: Not on file   Tobacco Use     Smoking status: Current Some Day Smoker     Packs/day: 2.86     Years: 61.00     Pack years: 174.46     Smokeless tobacco: Never Used   Substance and Sexual Activity     Alcohol use: Never     Frequency: Never     Drug use: Never     Sexual activity: Not  on file   Lifestyle     Physical activity     Days per week: Not on file     Minutes per session: Not on file     Stress: Not on file   Relationships     Social connections     Talks on phone: Not on file     Gets together: Not on file     Attends [employer] service: Not on file     Active member of club or organization: Not on file     Attends meetings of clubs or organizations: Not on file     Relationship status: Not on file     Intimate partner violence     Fear of current or ex partner: Not on file     Emotionally abused: Not on file     Physically abused: Not on file     Forced sexual activity: Not on file   Other Topics Concern     Not on file   Social History Narrative     Not on file       MEDICATIONS: Reviewed from the MAR, physician orders, and/or earlier progress notes.  Post Discharge Medication Reconciliation Status: medication reconciliation previously completed during another office visit.    Updated by me today (08/10/2020) with an increase in gabapentin from 200 mg 3 times daily to 300 mg 3 times daily reflected below.  Current Outpatient Medications   Medication Sig     acetaminophen (TYLENOL) 650 MG CR tablet Take 650 mg by mouth every morning.     atorvastatin (LIPITOR) 10 MG tablet Take 1 tablet (10 mg total) by mouth at bedtime.     cyclobenzaprine (FLEXERIL) 10 MG tablet Take 10 mg by mouth 3 (three) times a day as needed for muscle spasms.     gabapentin (NEURONTIN) 100 MG capsule Take 300 mg by mouth 3 (three) times a day.      megestroL (MEGACE) 400 mg/10 mL (40 mg/mL) Take 800 mg by mouth daily.     metFORMIN (GLUCOPHAGE-XR) 500 MG 24 hr tablet Take 500 mg by mouth daily.     multivitamin with minerals (THERA-M) 9 mg iron-400 mcg Tab tablet Take 1 tablet by mouth daily.     polyethylene glycol (MIRALAX) 17 gram packet Take 1 packet (17 g total) by mouth daily.     thiamine 100 MG tablet Take 1 tablet (100 mg total) by mouth daily.     ALLERGIES: No Known Allergies    DIET: Regular, regular  "texture, thin liquids.  Mighty shake sugar-free.    Vitals:    08/10/20 1811   BP: 122/52   Pulse: 92   Resp: 16   Temp: 98.5  F (36.9  C)   SpO2: 97%   Weight: 153 lb (69.4 kg)   Height: 5' 7.5\" (1.715 m)   Questionable weight, as she has also been documented as weighing 20 pounds more and about 20 pounds less.  Pulse is generally in the 80s to 90s, occasionally more or less.  Body mass index is 23.61 kg/m .    EXAMINATION:   General: Fairly pleasant, alert, and conversant elderly female, looking much younger than her stated age, lying in bed, looking very uncomfortable.  Head: Normocephalic and atraumatic.   Eyes: PERRLA, sclerae clear.   ENT: Moist oral mucosa.   Cardiovascular: Previously: Regular rate and rhythm with no appreciable murmur.   Respiratory: Loose, phlegmy cough has returned.  Lungs are still clear to auscultation bilaterally.   Abdomen: Soft and nontender.   Musculoskeletal/Extremities: Age-related degenerative joint disease.  Left trace pedal edema.  Integument: No rashes, clinically significant lesions, or skin breakdown.   Cognitive/Psychiatric: Generally appears alert and oriented, although I question the possibility of some cognitive deficits, and she has been seen by SLP.  When she is not painful, affect is euthymic.    DIAGNOSTICS:     Results for orders placed or performed in visit on 08/05/20   Comprehensive Metabolic Panel   Result Value Ref Range    Sodium 142 136 - 145 mmol/L    Potassium 4.1 3.5 - 5.0 mmol/L    Chloride 107 98 - 107 mmol/L    CO2 25 22 - 31 mmol/L    Anion Gap, Calculation 10 5 - 18 mmol/L    Glucose 94 70 - 125 mg/dL    BUN 8 8 - 28 mg/dL    Creatinine 0.55 (L) 0.60 - 1.10 mg/dL    GFR MDRD Af Amer >60 >60 mL/min/1.73m2    GFR MDRD Non Af Amer >60 >60 mL/min/1.73m2    Bilirubin, Total 0.3 0.0 - 1.0 mg/dL    Calcium 8.2 (L) 8.5 - 10.5 mg/dL    Protein, Total 4.8 (L) 6.0 - 8.0 g/dL    Albumin 2.2 (L) 3.5 - 5.0 g/dL    Alkaline Phosphatase 109 45 - 120 U/L    AST 17 0 - " 40 U/L    ALT 15 0 - 45 U/L     Lab Results   Component Value Date    WBC 10.8 07/27/2020    HGB 9.0 (L) 07/27/2020    HCT 28.1 (L) 07/27/2020    MCV 85 07/27/2020     (H) 07/28/2020     Estimated Creatinine Clearance: 90.9 mL/min (A) (by C-G formula based on SCr of 0.55 mg/dL (L)).  Lab Results   Component Value Date    HGBA1C 7.3 (H) 05/10/2020       ASSESSMENT/Plan:      ICD-10-CM    1. Failure to thrive (0-17)  R62.51    2. Pain in both lower extremities  M79.604     M79.605    3. Neuropathy  G62.9    4. History of RLL pneumonia  Z87.01    5. Protein-calorie malnutrition, moderate (H)  E44.0    6. Short-term memory loss  R41.3    7. Hypokalemia  E87.6    8. Essential hypertension, benign  I10      CHANGES:    Increase gabapentin from 200 mg 3 times daily to 300 mg 3 times daily.    CARE PLAN:    The care plan has been reviewed and all orders signed. Changes to care plan, if any, as noted. Otherwise, continue current plan of care.      The above has been created using voice recognition software. Please be aware that this may unintentionally  produce inaccuracies and/or nonsensical sentences.      Electronically signed by: Trevor Reeder CNP

## 2022-02-17 PROBLEM — R62.51 FAILURE TO THRIVE IN PEDIATRIC PATIENT: Status: ACTIVE | Noted: 2020-01-01
